# Patient Record
Sex: FEMALE | URBAN - METROPOLITAN AREA
[De-identification: names, ages, dates, MRNs, and addresses within clinical notes are randomized per-mention and may not be internally consistent; named-entity substitution may affect disease eponyms.]

---

## 2018-06-02 PROBLEM — Z00.00 ENCOUNTER FOR PREVENTIVE HEALTH EXAMINATION: Status: ACTIVE | Noted: 2018-06-02

## 2018-09-26 ENCOUNTER — EMERGENCY (EMERGENCY)
Facility: HOSPITAL | Age: 33
LOS: 0 days | Discharge: HOME | End: 2018-09-26
Admitting: EMERGENCY MEDICINE

## 2018-09-26 ENCOUNTER — OUTPATIENT (OUTPATIENT)
Dept: OUTPATIENT SERVICES | Facility: HOSPITAL | Age: 33
LOS: 1 days | Discharge: HOME | End: 2018-09-26

## 2018-09-26 VITALS
TEMPERATURE: 98 F | HEART RATE: 138 BPM | SYSTOLIC BLOOD PRESSURE: 138 MMHG | DIASTOLIC BLOOD PRESSURE: 64 MMHG | OXYGEN SATURATION: 94 % | RESPIRATION RATE: 20 BRPM

## 2018-09-26 VITALS
OXYGEN SATURATION: 96 % | SYSTOLIC BLOOD PRESSURE: 125 MMHG | DIASTOLIC BLOOD PRESSURE: 69 MMHG | TEMPERATURE: 98 F | HEART RATE: 139 BPM | RESPIRATION RATE: 20 BRPM

## 2018-09-26 DIAGNOSIS — R00.2 PALPITATIONS: ICD-10-CM

## 2018-09-26 DIAGNOSIS — O99.89 OTHER SPECIFIED DISEASES AND CONDITIONS COMPLICATING PREGNANCY, CHILDBIRTH AND THE PUERPERIUM: ICD-10-CM

## 2018-09-26 DIAGNOSIS — Z02.9 ENCOUNTER FOR ADMINISTRATIVE EXAMINATIONS, UNSPECIFIED: ICD-10-CM

## 2018-09-26 NOTE — ED PROVIDER NOTE - OBJECTIVE STATEMENT
32 y/o  female currently 36 wk pregnant presents with 5 minute episode of palpitations earlier tonight. unknown palliating/provoking factors. +hx of previous, never worked up. no cp/chest pressure/SOB/BCEKER.  Denies back pain, abdominal pain, n/v/d, black or bloody stools, fevers, trauma, fall, cough, recent travel, recent illness, sick contacts, leg pain/swelling, urinary symptoms, rash.

## 2018-09-26 NOTE — ED PROVIDER NOTE - PHYSICAL EXAMINATION
PHYSICAL EXAM:    GENERAL: Alert, appears stated age, well appearing, non-toxic. gravid.   SKIN: Warm, pink and dry. MMM.   EYE: Normal lids/conjunctiva, PERRL, EOMI  ENT: Normal hearing, patent oropharynx  NECK: +supple. No meningismus  Pulm: Bilateral BS, normal resp effort, no wheezes, stridor, or retractions  CV: RRR, no M/R/G, 2+ pulses   Abd: soft, non-tender. gravid.   Mskel: no erythema, cyanosis, edema. no calf tenderness.   Neuro: AAOx3, no sensory/motor deficits, CN 2-12 intact. No speech slurring, pronator drift, facial asymmetry. normal finger-to-nose b/l. 5/5 strength throughout. normal gait. negative romberg.

## 2018-09-26 NOTE — ED ADULT NURSE NOTE - OBJECTIVE STATEMENT
Patient reports she began to have sob and palpitation which lasted 5-10 minutes. Patient is 36 weeks pregnant. Symptoms subsided once picked up by ems.

## 2018-09-26 NOTE — ED ADULT NURSE NOTE - NSIMPLEMENTINTERV_GEN_ALL_ED
Implemented All Universal Safety Interventions:  [place] to call system. Call bell, personal items and telephone within reach. Instruct patient to call for assistance. Room bathroom lighting operational. Non-slip footwear when patient is off stretcher. Physically safe environment: no spills, clutter or unnecessary equipment. Stretcher in lowest position, wheels locked, appropriate side rails in place.

## 2018-09-26 NOTE — ED ADULT NURSE NOTE - NS ED NURSE LEVEL OF CONSCIOUSNESS AFFECT
Calm
For information on Fall & Injury Prevention, visit [email or web address]

## 2018-09-26 NOTE — ED PROVIDER NOTE - PROGRESS NOTE DETAILS
pt continues to deny symptoms.  on bedsides sono. discussed with OB resident, recommend our workup and then send to L&D for fetal heart tracing. pt feels well.  HR improved to 99.  has some lower back pain from sitting in the stretcher, but ready to be assessed in L&D.  no cp, no sob.

## 2018-09-26 NOTE — ED PROVIDER NOTE - ATTENDING CONTRIBUTION TO CARE
34 y/o F , 36 weeks pregnant - episode of palpitations that lasted 5 mins today.  no sob, no cp.  No ap, vag d/c or bleeding.  No sxs now. 32 y/o F , 36 weeks pregnant - episode of palpitations that started at 7pm and lasted a few minutes today then resolved.  Pt has had a few of these episodes through pregnancy and when they happen she does have some sob, but only then the episodes occur.  No sob at any other time.  no cp.  No ap, vag d/c or bleeding.  No sxs now here in ER even though she is slightly tachy at 114.  Received 1 L of fluid from EMS.  No recent illness. no n/v/d. No dysuria.  EXAM: well appearing. NAD. s1s2, reg. CTAB. abd soft, nd, nt, gravid.  bedside US with FHR in 120s.  P: ob c/s.  labs, ekg, ivf.

## 2018-09-27 VITALS
SYSTOLIC BLOOD PRESSURE: 122 MMHG | TEMPERATURE: 99 F | DIASTOLIC BLOOD PRESSURE: 73 MMHG | HEART RATE: 115 BPM | RESPIRATION RATE: 16 BRPM

## 2018-09-27 DIAGNOSIS — Z90.49 ACQUIRED ABSENCE OF OTHER SPECIFIED PARTS OF DIGESTIVE TRACT: Chronic | ICD-10-CM

## 2018-09-27 LAB
ANION GAP SERPL CALC-SCNC: 13 MMOL/L — SIGNIFICANT CHANGE UP (ref 7–14)
BASOPHILS # BLD AUTO: 0.02 K/UL — SIGNIFICANT CHANGE UP (ref 0–0.2)
BASOPHILS NFR BLD AUTO: 0.2 % — SIGNIFICANT CHANGE UP (ref 0–1)
BUN SERPL-MCNC: 6 MG/DL — LOW (ref 10–20)
CALCIUM SERPL-MCNC: 8.6 MG/DL — SIGNIFICANT CHANGE UP (ref 8.5–10.1)
CHLORIDE SERPL-SCNC: 106 MMOL/L — SIGNIFICANT CHANGE UP (ref 98–110)
CO2 SERPL-SCNC: 20 MMOL/L — SIGNIFICANT CHANGE UP (ref 17–32)
CREAT SERPL-MCNC: 0.5 MG/DL — LOW (ref 0.7–1.5)
EOSINOPHIL # BLD AUTO: 0.05 K/UL — SIGNIFICANT CHANGE UP (ref 0–0.7)
EOSINOPHIL NFR BLD AUTO: 0.5 % — SIGNIFICANT CHANGE UP (ref 0–8)
GLUCOSE SERPL-MCNC: 126 MG/DL — HIGH (ref 70–99)
HCT VFR BLD CALC: 29.7 % — LOW (ref 37–47)
HGB BLD-MCNC: 9.4 G/DL — LOW (ref 12–16)
IMM GRANULOCYTES NFR BLD AUTO: 0.4 % — HIGH (ref 0.1–0.3)
LYMPHOCYTES # BLD AUTO: 2.02 K/UL — SIGNIFICANT CHANGE UP (ref 1.2–3.4)
LYMPHOCYTES # BLD AUTO: 22.2 % — SIGNIFICANT CHANGE UP (ref 20.5–51.1)
MAGNESIUM SERPL-MCNC: 1.8 MG/DL — SIGNIFICANT CHANGE UP (ref 1.8–2.4)
MCHC RBC-ENTMCNC: 25.7 PG — LOW (ref 27–31)
MCHC RBC-ENTMCNC: 31.6 G/DL — LOW (ref 32–37)
MCV RBC AUTO: 81.1 FL — SIGNIFICANT CHANGE UP (ref 81–99)
MONOCYTES # BLD AUTO: 0.61 K/UL — HIGH (ref 0.1–0.6)
MONOCYTES NFR BLD AUTO: 6.7 % — SIGNIFICANT CHANGE UP (ref 1.7–9.3)
NEUTROPHILS # BLD AUTO: 6.37 K/UL — SIGNIFICANT CHANGE UP (ref 1.4–6.5)
NEUTROPHILS NFR BLD AUTO: 70 % — SIGNIFICANT CHANGE UP (ref 42.2–75.2)
NRBC # BLD: 0 /100 WBCS — SIGNIFICANT CHANGE UP (ref 0–0)
PLATELET # BLD AUTO: 200 K/UL — SIGNIFICANT CHANGE UP (ref 130–400)
POTASSIUM SERPL-MCNC: 3.8 MMOL/L — SIGNIFICANT CHANGE UP (ref 3.5–5)
POTASSIUM SERPL-SCNC: 3.8 MMOL/L — SIGNIFICANT CHANGE UP (ref 3.5–5)
RBC # BLD: 3.66 M/UL — LOW (ref 4.2–5.4)
RBC # FLD: 14.5 % — SIGNIFICANT CHANGE UP (ref 11.5–14.5)
SODIUM SERPL-SCNC: 139 MMOL/L — SIGNIFICANT CHANGE UP (ref 135–146)
WBC # BLD: 9.11 K/UL — SIGNIFICANT CHANGE UP (ref 4.8–10.8)
WBC # FLD AUTO: 9.11 K/UL — SIGNIFICANT CHANGE UP (ref 4.8–10.8)

## 2018-09-27 RX ORDER — ACETAMINOPHEN 500 MG
650 TABLET ORAL ONCE
Qty: 0 | Refills: 0 | Status: COMPLETED | OUTPATIENT
Start: 2018-09-27 | End: 2018-09-27

## 2018-09-27 RX ORDER — SODIUM CHLORIDE 9 MG/ML
1000 INJECTION INTRAMUSCULAR; INTRAVENOUS; SUBCUTANEOUS ONCE
Qty: 0 | Refills: 0 | Status: COMPLETED | OUTPATIENT
Start: 2018-09-27 | End: 2018-09-27

## 2018-09-27 RX ORDER — ACETAMINOPHEN 500 MG
650 TABLET ORAL ONCE
Qty: 0 | Refills: 0 | Status: DISCONTINUED | OUTPATIENT
Start: 2018-09-27 | End: 2018-10-11

## 2018-09-27 RX ADMIN — SODIUM CHLORIDE 1000 MILLILITER(S): 9 INJECTION INTRAMUSCULAR; INTRAVENOUS; SUBCUTANEOUS at 01:31

## 2018-09-27 NOTE — OB PROVIDER TRIAGE NOTE - NSHPLABSRESULTS_GEN_ALL_CORE
9.4    9.11  )-----------( 200      ( 27 Sep 2018 01:25 )             29.7       09-27    139  |  106  |  6<L>  ----------------------------<  126<H>  3.8   |  20  |  0.5<L>    Ca    8.6      27 Sep 2018 01:25  Mg     1.8     09-27 9.4    9.11  )-----------( 200      ( 27 Sep 2018 01:25 )             29.7       09-27    139  |  106  |  6<L>  ----------------------------<  126<H>  3.8   |  20  |  0.5<L>    Ca    8.6      27 Sep 2018 01:25  Mg     1.8     09-27          9/24/18- HIV NR; GBS POS   3/15/18- O pos; RPR NR, HIV NR, HbsAg NR; rubella immune

## 2018-09-27 NOTE — OB PROVIDER TRIAGE NOTE - NSOBPROVIDERNOTE_OBGYN_ALL_OB_FT
32yo  at 36w3d GA, GBS pos, h/o previous C/S X1,  X4, with BPP 8/8 and reassuring maternal and fetal status, not in  labor; palpitations now resolved.   - d/c home   - iron PO qD   - ambulation/PO hydration  -  labor precautions/FKC   - f/u next scheduled appt.

## 2018-09-27 NOTE — OB PROVIDER TRIAGE NOTE - HISTORY OF PRESENT ILLNESS
32yo  at 36w3d GA, by 1st trim sono, c/o palpitations since earlier this evening. Cleared by ED, EKG X 2 showed sinus tachycardia. Palpitations now resolved. Denies dizziness/lightheadedness/CP/SOB/syncopal episodes. Denies ctx, incisional pain, VB/LOF. Good FM. Denies fever, chills, nausea/vomiting, diarrhea/constipation, dysuria, urgency, frequency. No complications during this pregnancy. H/o C/S X1 for CPD followed by  X4.     ObHx: C/S X1- 9lb1oz for CPD;  X4, 5ip85db; h/o GDMA1 in last pregnancy    GynHx: denies h/o fibroids, ovarian cysts, abnormal pap smears, STIs. 34yo  at 36w3d GA, by 1st trim sono, c/o palpitations since earlier this evening. Cleared by ED, EKG X 2 showed sinus tachycardia. Palpitations now resolved after drinking fluids. Denies dizziness/lightheadedness/CP/SOB/syncopal episodes. Denies ctx, incisional pain, VB/LOF. Good FM. Denies fever, chills, nausea/vomiting, diarrhea/constipation, dysuria, urgency, frequency. No complications during this pregnancy. H/o C/S X1 for CPD followed by  X4.     ObHx: C/S X1- 9lb1oz for CPD;  X4, 0ks55am; h/o GDMA1 in last pregnancy    GynHx: denies h/o fibroids, ovarian cysts, abnormal pap smears, STIs.

## 2018-09-27 NOTE — OB PROVIDER TRIAGE NOTE - NSHPPHYSICALEXAM_GEN_ALL_CORE
Vital Signs Last 24 Hrs  T(C): 37.1 (27 Sep 2018 03:11), Max: 37.1 (27 Sep 2018 03:11)  T(F): 98.7 (27 Sep 2018 03:11), Max: 98.7 (27 Sep 2018 03:11)  HR: 115 (27 Sep 2018 03:11) (99 - 139)  BP: 122/73 (27 Sep 2018 03:11) (114/56 - 138/64)  RR: 16 (27 Sep 2018 03:11) (16 - 20)  SpO2: 97% (27 Sep 2018 01:00) (94% - 97%)     Gen: NAD, AAO X 3  Heart: tachycardic, no M/R/G   Lungs: CTA B/L, no r/r/w   Abd: soft, gravid, nontender, no palpable ctx, no incisional tenderness Vital Signs Last 24 Hrs  T(C): 37.1 (27 Sep 2018 03:11), Max: 37.1 (27 Sep 2018 03:11)  T(F): 98.7 (27 Sep 2018 03:11), Max: 98.7 (27 Sep 2018 03:11)  HR: 115 (27 Sep 2018 03:11) (99 - 139)  BP: 122/73 (27 Sep 2018 03:11) (114/56 - 138/64)  RR: 16 (27 Sep 2018 03:11) (16 - 20)  SpO2: 97% (27 Sep 2018 01:00) (94% - 97%)     Gen: NAD, AAO X 3  Heart: tachycardic, no M/R/G   Lungs: CTA B/L, no r/r/w   Abd: soft, gravid, nontender, no palpable ctx, no incisional tenderness  EFM: 135/mod/accels+  [place]: no ctx   SVE: deferred

## 2018-10-30 ENCOUNTER — INPATIENT (INPATIENT)
Facility: HOSPITAL | Age: 33
LOS: 1 days | Discharge: HOME | End: 2018-11-01
Attending: OBSTETRICS & GYNECOLOGY | Admitting: OBSTETRICS & GYNECOLOGY
Payer: MEDICAID

## 2018-10-30 VITALS — TEMPERATURE: 98 F

## 2018-10-30 DIAGNOSIS — Z90.49 ACQUIRED ABSENCE OF OTHER SPECIFIED PARTS OF DIGESTIVE TRACT: Chronic | ICD-10-CM

## 2018-10-30 LAB
AMPHET UR-MCNC: NEGATIVE — SIGNIFICANT CHANGE UP
APPEARANCE UR: ABNORMAL
BACTERIA # UR AUTO: ABNORMAL /HPF
BARBITURATES UR SCN-MCNC: NEGATIVE — SIGNIFICANT CHANGE UP
BASOPHILS # BLD AUTO: 0.03 K/UL — SIGNIFICANT CHANGE UP (ref 0–0.2)
BASOPHILS NFR BLD AUTO: 0.2 % — SIGNIFICANT CHANGE UP (ref 0–1)
BENZODIAZ UR-MCNC: NEGATIVE — SIGNIFICANT CHANGE UP
BILIRUB UR-MCNC: NEGATIVE — SIGNIFICANT CHANGE UP
BLD GP AB SCN SERPL QL: SIGNIFICANT CHANGE UP
COCAINE METAB.OTHER UR-MCNC: NEGATIVE — SIGNIFICANT CHANGE UP
COLOR SPEC: YELLOW — SIGNIFICANT CHANGE UP
DIFF PNL FLD: ABNORMAL
EOSINOPHIL # BLD AUTO: 0.03 K/UL — SIGNIFICANT CHANGE UP (ref 0–0.7)
EOSINOPHIL NFR BLD AUTO: 0.2 % — SIGNIFICANT CHANGE UP (ref 0–8)
EPI CELLS # UR: ABNORMAL /HPF
GLUCOSE UR QL: NEGATIVE MG/DL — SIGNIFICANT CHANGE UP
HCT VFR BLD CALC: 35.6 % — LOW (ref 37–47)
HGB BLD-MCNC: 10.9 G/DL — LOW (ref 12–16)
IMM GRANULOCYTES NFR BLD AUTO: 0.5 % — HIGH (ref 0.1–0.3)
KETONES UR-MCNC: 15
LEUKOCYTE ESTERASE UR-ACNC: ABNORMAL
LYMPHOCYTES # BLD AUTO: 2.54 K/UL — SIGNIFICANT CHANGE UP (ref 1.2–3.4)
LYMPHOCYTES # BLD AUTO: 20.5 % — SIGNIFICANT CHANGE UP (ref 20.5–51.1)
MCHC RBC-ENTMCNC: 23.5 PG — LOW (ref 27–31)
MCHC RBC-ENTMCNC: 30.6 G/DL — LOW (ref 32–37)
MCV RBC AUTO: 76.9 FL — LOW (ref 81–99)
METHADONE UR-MCNC: NEGATIVE — SIGNIFICANT CHANGE UP
MONOCYTES # BLD AUTO: 0.94 K/UL — HIGH (ref 0.1–0.6)
MONOCYTES NFR BLD AUTO: 7.6 % — SIGNIFICANT CHANGE UP (ref 1.7–9.3)
NEUTROPHILS # BLD AUTO: 8.79 K/UL — HIGH (ref 1.4–6.5)
NEUTROPHILS NFR BLD AUTO: 71 % — SIGNIFICANT CHANGE UP (ref 42.2–75.2)
NITRITE UR-MCNC: NEGATIVE — SIGNIFICANT CHANGE UP
NRBC # BLD: 0 /100 WBCS — SIGNIFICANT CHANGE UP (ref 0–0)
OPIATES UR-MCNC: NEGATIVE — SIGNIFICANT CHANGE UP
PCP SPEC-MCNC: SIGNIFICANT CHANGE UP
PH UR: 6 — SIGNIFICANT CHANGE UP (ref 5–8)
PLATELET # BLD AUTO: 213 K/UL — SIGNIFICANT CHANGE UP (ref 130–400)
PRENATAL SYPHILIS TEST: SIGNIFICANT CHANGE UP
PROPOXYPHENE QUALITATIVE URINE RESULT: NEGATIVE — SIGNIFICANT CHANGE UP
PROT UR-MCNC: NEGATIVE MG/DL — SIGNIFICANT CHANGE UP
RBC # BLD: 4.63 M/UL — SIGNIFICANT CHANGE UP (ref 4.2–5.4)
RBC # FLD: 15.8 % — HIGH (ref 11.5–14.5)
RBC CASTS # UR COMP ASSIST: ABNORMAL /HPF
SP GR SPEC: 1.02 — SIGNIFICANT CHANGE UP (ref 1.01–1.03)
T PALLIDUM AB TITR SER: NEGATIVE — SIGNIFICANT CHANGE UP
TYPE + AB SCN PNL BLD: SIGNIFICANT CHANGE UP
UROBILINOGEN FLD QL: 0.2 MG/DL — SIGNIFICANT CHANGE UP (ref 0.2–0.2)
WBC # BLD: 12.39 K/UL — HIGH (ref 4.8–10.8)
WBC # FLD AUTO: 12.39 K/UL — HIGH (ref 4.8–10.8)
WBC UR QL: ABNORMAL /HPF

## 2018-10-30 RX ORDER — IBUPROFEN 200 MG
600 TABLET ORAL EVERY 6 HOURS
Qty: 0 | Refills: 0 | Status: DISCONTINUED | OUTPATIENT
Start: 2018-10-30 | End: 2018-11-01

## 2018-10-30 RX ORDER — ACETAMINOPHEN 500 MG
650 TABLET ORAL EVERY 6 HOURS
Qty: 0 | Refills: 0 | Status: DISCONTINUED | OUTPATIENT
Start: 2018-10-30 | End: 2018-11-01

## 2018-10-30 RX ORDER — OXYTOCIN 10 UNIT/ML
20 VIAL (ML) INJECTION ONCE
Qty: 0 | Refills: 0 | Status: DISCONTINUED | OUTPATIENT
Start: 2018-10-30 | End: 2018-11-01

## 2018-10-30 RX ORDER — ONDANSETRON 8 MG/1
4 TABLET, FILM COATED ORAL EVERY 6 HOURS
Qty: 0 | Refills: 0 | Status: DISCONTINUED | OUTPATIENT
Start: 2018-10-30 | End: 2018-11-01

## 2018-10-30 RX ORDER — NALOXONE HYDROCHLORIDE 4 MG/.1ML
0.1 SPRAY NASAL
Qty: 0 | Refills: 0 | Status: DISCONTINUED | OUTPATIENT
Start: 2018-10-30 | End: 2018-11-01

## 2018-10-30 RX ORDER — AMPICILLIN TRIHYDRATE 250 MG
2 CAPSULE ORAL ONCE
Qty: 0 | Refills: 0 | Status: COMPLETED | OUTPATIENT
Start: 2018-10-30 | End: 2018-10-30

## 2018-10-30 RX ORDER — SIMETHICONE 80 MG/1
80 TABLET, CHEWABLE ORAL EVERY 6 HOURS
Qty: 0 | Refills: 0 | Status: DISCONTINUED | OUTPATIENT
Start: 2018-10-30 | End: 2018-11-01

## 2018-10-30 RX ORDER — AMPICILLIN TRIHYDRATE 250 MG
CAPSULE ORAL
Qty: 0 | Refills: 0 | Status: DISCONTINUED | OUTPATIENT
Start: 2018-10-30 | End: 2018-10-30

## 2018-10-30 RX ORDER — DOCUSATE SODIUM 100 MG
100 CAPSULE ORAL
Qty: 0 | Refills: 0 | Status: DISCONTINUED | OUTPATIENT
Start: 2018-10-30 | End: 2018-11-01

## 2018-10-30 RX ORDER — DIPHENHYDRAMINE HCL 50 MG
25 CAPSULE ORAL EVERY 6 HOURS
Qty: 0 | Refills: 0 | Status: DISCONTINUED | OUTPATIENT
Start: 2018-10-30 | End: 2018-11-01

## 2018-10-30 RX ORDER — AMPICILLIN TRIHYDRATE 250 MG
1 CAPSULE ORAL EVERY 4 HOURS
Qty: 0 | Refills: 0 | Status: DISCONTINUED | OUTPATIENT
Start: 2018-10-30 | End: 2018-10-30

## 2018-10-30 RX ORDER — OXYTOCIN 10 UNIT/ML
125 VIAL (ML) INJECTION
Qty: 20 | Refills: 0 | Status: DISCONTINUED | OUTPATIENT
Start: 2018-10-30 | End: 2018-10-30

## 2018-10-30 RX ORDER — LANOLIN
1 OINTMENT (GRAM) TOPICAL EVERY 6 HOURS
Qty: 0 | Refills: 0 | Status: DISCONTINUED | OUTPATIENT
Start: 2018-10-30 | End: 2018-11-01

## 2018-10-30 RX ORDER — SODIUM CHLORIDE 9 MG/ML
500 INJECTION, SOLUTION INTRAVENOUS ONCE
Qty: 0 | Refills: 0 | Status: DISCONTINUED | OUTPATIENT
Start: 2018-10-30 | End: 2018-10-30

## 2018-10-30 RX ORDER — FENTANYL/BUPIVACAINE/NS/PF 2MCG/ML-.1
250 PLASTIC BAG, INJECTION (ML) INJECTION
Qty: 0 | Refills: 0 | Status: DISCONTINUED | OUTPATIENT
Start: 2018-10-30 | End: 2018-11-01

## 2018-10-30 RX ORDER — AER TRAVELER 0.5 G/1
1 SOLUTION RECTAL; TOPICAL EVERY 4 HOURS
Qty: 0 | Refills: 0 | Status: DISCONTINUED | OUTPATIENT
Start: 2018-10-30 | End: 2018-11-01

## 2018-10-30 RX ORDER — MAGNESIUM HYDROXIDE 400 MG/1
30 TABLET, CHEWABLE ORAL
Qty: 0 | Refills: 0 | Status: DISCONTINUED | OUTPATIENT
Start: 2018-10-30 | End: 2018-11-01

## 2018-10-30 RX ORDER — DIBUCAINE 1 %
1 OINTMENT (GRAM) RECTAL EVERY 4 HOURS
Qty: 0 | Refills: 0 | Status: DISCONTINUED | OUTPATIENT
Start: 2018-10-30 | End: 2018-11-01

## 2018-10-30 RX ORDER — OXYCODONE AND ACETAMINOPHEN 5; 325 MG/1; MG/1
2 TABLET ORAL EVERY 6 HOURS
Qty: 0 | Refills: 0 | Status: DISCONTINUED | OUTPATIENT
Start: 2018-10-30 | End: 2018-11-01

## 2018-10-30 RX ORDER — SODIUM CHLORIDE 9 MG/ML
1000 INJECTION, SOLUTION INTRAVENOUS
Qty: 0 | Refills: 0 | Status: DISCONTINUED | OUTPATIENT
Start: 2018-10-30 | End: 2018-10-30

## 2018-10-30 RX ORDER — OXYTOCIN 10 UNIT/ML
41.67 VIAL (ML) INJECTION
Qty: 20 | Refills: 0 | Status: DISCONTINUED | OUTPATIENT
Start: 2018-10-30 | End: 2018-11-01

## 2018-10-30 RX ADMIN — Medication 0.2 MILLIGRAM(S): at 11:41

## 2018-10-30 RX ADMIN — Medication 600 MILLIGRAM(S): at 23:40

## 2018-10-30 RX ADMIN — Medication 0.2 MILLIGRAM(S): at 19:14

## 2018-10-30 RX ADMIN — Medication 600 MILLIGRAM(S): at 11:01

## 2018-10-30 RX ADMIN — Medication 600 MILLIGRAM(S): at 23:06

## 2018-10-30 RX ADMIN — Medication 0.2 MILLIGRAM(S): at 15:12

## 2018-10-30 RX ADMIN — Medication 125 MILLIUNIT(S)/MIN: at 11:51

## 2018-10-30 RX ADMIN — Medication 216 GRAM(S): at 04:00

## 2018-10-30 RX ADMIN — Medication 208 GRAM(S): at 07:56

## 2018-10-30 RX ADMIN — OXYCODONE AND ACETAMINOPHEN 2 TABLET(S): 5; 325 TABLET ORAL at 11:51

## 2018-10-30 NOTE — OB PROVIDER H&P - NSHPLABSRESULTS_GEN_ALL_CORE
early       22w0d GA AGA 535g, 21%ile, post placenta, agfv nl, CL 37mm, placental lake 4.1x1.9cm, anatomy nl  28w3d GA vtx, BPP 8/8, MVP 5.92cm, EFW 1640g (>97%ile), no placental lakes  33w2d GA EFW 2649g (61%ile)

## 2018-10-30 NOTE — OB PROVIDER H&P - HISTORY OF PRESENT ILLNESS
33yo  at 41w1d GA presents with contractions x1 day, worse for the past few hours, now 10/10 intensity every few minutes.  Denies LOF, vaginal bleeding.  Reports good fetal movement.  Pt has h/o GDMA P5, negative GCT x2 this pregnancy.  H/o c/s with P1, largest infant.  VBACx4, desires TOLAC.  Pt prenatal sonogram at 28wks showed fetus measuring >97%ile, pt unsure if she has had a sonogram since that time.

## 2018-10-30 NOTE — CHART NOTE - NSCHARTNOTEFT_GEN_A_CORE
JESSI PGY2 Note:     Pt seen and evaluated at bedside for vaginal bleeding. Denies dizziness/lightheadedness/CP/SOB/palpitations.   Bimanual massage performed, fundus found to be firm; 400cc clots evacuated from lower segment.   Bertrand catheter replaced, 100cc urine drained.     Vital Signs Last 24 Hrs  T(C): 36.9 (30 Oct 2018 04:01), Max: 37.1 (30 Oct 2018 03:08)  T(F): 98.4 (30 Oct 2018 04:01), Max: 98.7 (30 Oct 2018 03:08)  HR: 110 (30 Oct 2018 11:18) (81 - 142)  BP: 146/84 (30 Oct 2018 11:18) (79/52 - 146/84)  RR: 18 (30 Oct 2018 05:09) (16 - 18)    - extra 20U pitocin added IVPB   - will re-evaluate     Dr. Haywood and Dr. Olson aware. OBSUGARN PGY2 Note:     Pt seen and evaluated at bedside for vaginal bleeding. Denies dizziness/lightheadedness/CP/SOB/palpitations.   Bimanual massage performed, fundus found to be firm; 400cc clots evacuated from lower segment.   Bertrand catheter replaced, 100cc urine drained.     Vital Signs Last 24 Hrs  T(C): 36.9 (30 Oct 2018 04:01), Max: 37.1 (30 Oct 2018 03:08)  T(F): 98.4 (30 Oct 2018 04:01), Max: 98.7 (30 Oct 2018 03:08)  HR: 110 (30 Oct 2018 11:18) (81 - 142)  BP: 146/84 (30 Oct 2018 11:18) (79/52 - 146/84)  RR: 18 (30 Oct 2018 05:09) (16 - 18)    - extra 20U pitocin added IVPB   - will re-evaluate     Dr. Haywood and Dr. Olson aware.      Addendum - Dr Olson    I expressed 200 cc more of blood clots. Retinaed placenta membranes removed. Will methergine Im and start methergine PO fro 24 hours. uterus firm.

## 2018-10-30 NOTE — PROCEDURE NOTE - ADDITIONAL PROCEDURE DETAILS
Epidural infusion - 0.1% bupivacaine + fentanyl 2mcg/ml at 15ml/hr Epidural infusion - 0.1% bupivacaine + fentanyl 2mcg/ml at 15ml/hr    Bolus 0550 10/30/18 - 0.125% bupiv 10ml. Epidural infusion - 0.1% bupivacaine + fentanyl 2mcg/ml at 15ml/hr    Bolus 0550 10/30/18 - 0.125% bupiv 10ml.    Pt reported pain after bolus. On exam she had a one sided block with left sided sparing. Catheter was withdrawn 2cm and bolused with 10ml of 0.25% bupiv which provided great relief, T8 level bilaterally.

## 2018-10-30 NOTE — OB PROVIDER H&P - NS_OBGYNHISTORY_OBGYN_ALL_OB_FT
OB Hx: c/s x1 arrest of descent, 9lb1oz, VBACx4, largest 8xa82pt, h/o GDMA1 P5, h/o open appendectomy in pregnancy (cannot remember which pregnancy)  Gyn Hx: denies h/o abnormal PAP, ovarian cysts, STIs, uterine fibroids

## 2018-10-30 NOTE — OB PROVIDER DELIVERY SUMMARY - NSPROVIDERDELIVERYNOTE_OBGYN_ALL_OB_FT
Patient was fully dilated and pushing. Fetal head was OA and restituted to ROT. The anterior shoulder did not deliver spontaneously, eden and suprapubic attemtped, posterior arm delivery attempted as well. Then woodscrew as well. and posterior shoulders delivered, followed by the remaining body atraumatically. Delayed cord clamping was performed, and then clamped and cut. Cord blood gases collected x2. The  was handed to the pediatricians. The placenta delivered intact with membranes. Pitocin was administered (+/- additional interventions). Uterus massaged, fundus found to be firm. Cervix, vagina and perineum inspected (no lacerations OR x degree laceration was noted, repaired using (type of suture) in the usual fashion with good hemostasis.     Viable male/female infant delivered, weighing (gram or lbs), with APGARs ?/?    Lacteration:   EBL ####     present for the delivery Patient was fully dilated and pushing. Fetal head was OA and restituted to ROT. The anterior shoulder did not deliver spontaneously, eden and suprapubic attemtped, posterior arm delivery attempted as well. Then woodscrew as well.  Suproapubic pressure attempted again and anterior shoulder dislodged. Posterior shoulder delivered, followed by the remaining body atraumatically. There was a time difference of 2 mins from delivery of head to body. Cord clamped and cut. Cord blood gases collected x2. The  was handed to the pediatricians. The placenta delivered. Pitocin was administered . Uterus massaged, fundus found to be firm. Cervix, vagina and perineum inspected. Second degree laceration was noted, repaired using 2-0 chromic in the usual fashion with good hemostasis.     Viable female infant delivered, weighing 12 lb 3 oz, with APGARs 2/8    Lacteration: seocnd degree  EBL 350cc

## 2018-10-30 NOTE — PROCEDURE NOTE - SUPERVISORY STATEMENT
Vital signs stable  No anesthesia complications from labor epidural  Patient discharged to OB post partum care as per policy

## 2018-10-30 NOTE — OB PROVIDER H&P - NSHPPHYSICALEXAM_GEN_ALL_CORE
Vital Signs  T(F): 98.7   HR: 116   BP: 124/75   RR: 16   Udip: trace ketones    EFM: 120/mod/+accels  [place]: q2-5    Gen: discomfort with contractions  Abd: palpable contractions, no incisional tenderness  SVE: 7/80/-2, vtx, bulging membranes

## 2018-10-30 NOTE — OB PROVIDER H&P - ASSESSMENT
31yo  at 41w1d GA in active labor, GBS positive, grandmultip, with macrosomia, h/o c/s, VBACx4, desires TOLAC,  -admit to L&D  -continuous EMF/toco  -amp for GBS prophylaxis  -admission labs  -clear liquid diet  -pain mgmt prn, desires epidural    Dr Olson aware

## 2018-10-31 LAB
BASOPHILS # BLD AUTO: 0.02 K/UL — SIGNIFICANT CHANGE UP (ref 0–0.2)
BASOPHILS NFR BLD AUTO: 0.2 % — SIGNIFICANT CHANGE UP (ref 0–1)
EOSINOPHIL # BLD AUTO: 0.06 K/UL — SIGNIFICANT CHANGE UP (ref 0–0.7)
EOSINOPHIL NFR BLD AUTO: 0.6 % — SIGNIFICANT CHANGE UP (ref 0–8)
HCT VFR BLD CALC: 25.7 % — LOW (ref 37–47)
HGB BLD-MCNC: 7.7 G/DL — LOW (ref 12–16)
IMM GRANULOCYTES NFR BLD AUTO: 0.5 % — HIGH (ref 0.1–0.3)
LYMPHOCYTES # BLD AUTO: 1.91 K/UL — SIGNIFICANT CHANGE UP (ref 1.2–3.4)
LYMPHOCYTES # BLD AUTO: 18.6 % — LOW (ref 20.5–51.1)
MCHC RBC-ENTMCNC: 23.2 PG — LOW (ref 27–31)
MCHC RBC-ENTMCNC: 30 G/DL — LOW (ref 32–37)
MCV RBC AUTO: 77.4 FL — LOW (ref 81–99)
MONOCYTES # BLD AUTO: 0.73 K/UL — HIGH (ref 0.1–0.6)
MONOCYTES NFR BLD AUTO: 7.1 % — SIGNIFICANT CHANGE UP (ref 1.7–9.3)
NEUTROPHILS # BLD AUTO: 7.48 K/UL — HIGH (ref 1.4–6.5)
NEUTROPHILS NFR BLD AUTO: 73 % — SIGNIFICANT CHANGE UP (ref 42.2–75.2)
NRBC # BLD: 0 /100 WBCS — SIGNIFICANT CHANGE UP (ref 0–0)
PLATELET # BLD AUTO: 182 K/UL — SIGNIFICANT CHANGE UP (ref 130–400)
RBC # BLD: 3.32 M/UL — LOW (ref 4.2–5.4)
RBC # FLD: 15.8 % — HIGH (ref 11.5–14.5)
WBC # BLD: 10.25 K/UL — SIGNIFICANT CHANGE UP (ref 4.8–10.8)
WBC # FLD AUTO: 10.25 K/UL — SIGNIFICANT CHANGE UP (ref 4.8–10.8)

## 2018-10-31 RX ADMIN — Medication 0.2 MILLIGRAM(S): at 00:16

## 2018-10-31 RX ADMIN — Medication 1 TABLET(S): at 11:53

## 2018-10-31 RX ADMIN — Medication 0.2 MILLIGRAM(S): at 04:35

## 2018-10-31 RX ADMIN — Medication 600 MILLIGRAM(S): at 14:16

## 2018-10-31 RX ADMIN — Medication 600 MILLIGRAM(S): at 05:49

## 2018-10-31 RX ADMIN — Medication 0.2 MILLIGRAM(S): at 07:39

## 2018-10-31 NOTE — PROGRESS NOTE ADULT - SUBJECTIVE AND OBJECTIVE BOX
OB attending  PPD #1    Pt doing well, pain well controlled. No overnight events, no acute complaints.    Ambulating: Yes  Voiding: Yes  Flatus: Yes  Bowel movements: Yes   Breast or bottle feeding: Breastfeeding  Diet: Regular    PAST MEDICAL & SURGICAL HISTORY:  No pertinent past medical history  S/P appendectomy  Delivery by  section of full-term infant      Physical Exam  Vital Signs Last 24 Hrs  T(C): 35.7 (31 Oct 2018 07:48), Max: 36.9 (30 Oct 2018 13:56)  T(F): 96.3 (31 Oct 2018 07:48), Max: 98.4 (30 Oct 2018 13:56)  HR: 75 (31 Oct 2018 07:48) (73 - 110)  BP: 105/57 (31 Oct 2018 07:48) (102/51 - 146/84)  BP(mean): --  RR: 18 (31 Oct 2018 07:48) (18 - 18)  SpO2: --  Gen: AAOx3, NAD  Abd: Soft, nontender, nondistended, BS+  Fundus: Firm, below umbilicus  Lochia: normal  Ext: No calf tenderness, no swelling    Labs:                        7.7    10.25 )-----------( 182      ( 31 Oct 2018 07:18 )             25.7         A/P: s/p , PPD #1, doing well  - continue current management  anticipate d/c home 18

## 2018-11-01 ENCOUNTER — TRANSCRIPTION ENCOUNTER (OUTPATIENT)
Age: 33
End: 2018-11-01

## 2018-11-01 VITALS
HEART RATE: 79 BPM | OXYGEN SATURATION: 100 % | RESPIRATION RATE: 18 BRPM | SYSTOLIC BLOOD PRESSURE: 99 MMHG | DIASTOLIC BLOOD PRESSURE: 54 MMHG | TEMPERATURE: 98 F

## 2018-11-01 LAB
HCT VFR BLD CALC: 22.4 % — LOW (ref 37–47)
HCT VFR BLD CALC: 24.9 % — LOW (ref 37–47)
HGB BLD-MCNC: 7 G/DL — CRITICAL LOW (ref 12–16)
HGB BLD-MCNC: 7.7 G/DL — LOW (ref 12–16)
MCHC RBC-ENTMCNC: 23.8 PG — LOW (ref 27–31)
MCHC RBC-ENTMCNC: 24.4 PG — LOW (ref 27–31)
MCHC RBC-ENTMCNC: 30.9 G/DL — LOW (ref 32–37)
MCHC RBC-ENTMCNC: 31.3 G/DL — LOW (ref 32–37)
MCV RBC AUTO: 76.9 FL — LOW (ref 81–99)
MCV RBC AUTO: 78 FL — LOW (ref 81–99)
NRBC # BLD: 0 /100 WBCS — SIGNIFICANT CHANGE UP (ref 0–0)
NRBC # BLD: 0 /100 WBCS — SIGNIFICANT CHANGE UP (ref 0–0)
PLATELET # BLD AUTO: 197 K/UL — SIGNIFICANT CHANGE UP (ref 130–400)
PLATELET # BLD AUTO: 220 K/UL — SIGNIFICANT CHANGE UP (ref 130–400)
RBC # BLD: 2.87 M/UL — LOW (ref 4.2–5.4)
RBC # BLD: 3.24 M/UL — LOW (ref 4.2–5.4)
RBC # FLD: 15.9 % — HIGH (ref 11.5–14.5)
RBC # FLD: 15.9 % — HIGH (ref 11.5–14.5)
WBC # BLD: 6.22 K/UL — SIGNIFICANT CHANGE UP (ref 4.8–10.8)
WBC # BLD: 6.49 K/UL — SIGNIFICANT CHANGE UP (ref 4.8–10.8)
WBC # FLD AUTO: 6.22 K/UL — SIGNIFICANT CHANGE UP (ref 4.8–10.8)
WBC # FLD AUTO: 6.49 K/UL — SIGNIFICANT CHANGE UP (ref 4.8–10.8)

## 2018-11-01 PROCEDURE — 99231 SBSQ HOSP IP/OBS SF/LOW 25: CPT

## 2018-11-01 RX ADMIN — Medication 600 MILLIGRAM(S): at 11:10

## 2018-11-01 RX ADMIN — Medication 600 MILLIGRAM(S): at 08:26

## 2018-11-01 RX ADMIN — Medication 600 MILLIGRAM(S): at 00:02

## 2018-11-01 RX ADMIN — Medication 1 TABLET(S): at 11:11

## 2018-11-01 RX ADMIN — Medication 600 MILLIGRAM(S): at 00:32

## 2018-11-01 NOTE — DISCHARGE NOTE OB - CARE PROVIDER_API CALL
Darci Macias), Obstetrics and Gynecology  [address] [place] [address]  Phone: [phone]  Fax: [phone]

## 2018-11-01 NOTE — DISCHARGE NOTE OB - PATIENT PORTAL LINK FT
You can access the [facility] Patient Portal, offered by [facility], by registering with the following website: [email or web address]

## 2018-11-01 NOTE — PROGRESS NOTE ADULT - SUBJECTIVE AND OBJECTIVE BOX
Subjective:   Patient doing well. No complaints. Minimal lochia. Pain controlled.    Objective:   T(F): 97.6 ( @ 07:49), Max: 97.6 ( @ 07:49)  HR: 79 ( @ 07:49)  BP: 99/54 ( @ 07:49) (99/54 - 110/55)  RR: 18 ( @ 07:49)  SpO2: 100% ( @ :49) (100% - 100%)  Gen: AAOx3, NAD  Abd: Soft, Nontender, Nondistended, Fundus firm below the umbilicus  Ext: no tendern, mild edema  Min Lochia Rubra    Labs:                            7.7    6.49  )-----------( 220      ( 2018 11:03 )             24.9                         7.0    6.22  )-----------( 197      ( 2018 08:13 )             22.4                 Tolerating regular diet  Passed flatus, passed bowel movement  Breast/Bottle feeding    Assessment:   33y s/p , PPD#2, doing well    Plan:  -Routine postpartum care  -Encouraged ambulation and PO hydration  -Tolerating regular diet  -For Fe  -Precautions

## 2018-11-01 NOTE — DISCHARGE NOTE OB - CARE PLAN
Principal Discharge DX:	Vaginal delivery following previous  section, delivered  Goal:	healthy baby, healthy mom  Assessment and plan of treatment:	no sex, tampons 6 weeks

## 2018-11-01 NOTE — DISCHARGE NOTE OB - HOSPITAL COURSE
DATE OF DISCHARGE: 18 @ 07:12    HISTORY OF PRESENT ILLNESS/HOSPITAL COURSE: HPI:  33yo  at 41w1d GA presents with contractions x1 day, worse for the past few hours, now 10/10 intensity every few minutes.  Denies LOF, vaginal bleeding.  Reports good fetal movement.  Pt has h/o GDMA P5, negative GCT x2 this pregnancy.  H/o c/s with P1, largest infant.  VBACx4, desires TOLAC.  Pt prenatal sonogram at 28wks showed fetus measuring >97%ile, pt unsure if she has had a sonogram since that time. (30 Oct 2018 03:51)    PAST MEDICAL & SURGICAL HISTORY:  No pertinent past medical history  S/P appendectomy  Delivery by  section of full-term infant      PROCEDURES PERFORMED:     COMPLICATIONS:      POST PARTUM COURSE:       FINAL DIAGNOSIS:      LABS:                       7.7    10.25 )-----------( 182      ( 31 Oct 2018 07:18 )             25.7

## 2018-11-07 DIAGNOSIS — O34.219 MATERNAL CARE FOR UNSPECIFIED TYPE SCAR FROM PREVIOUS CESAREAN DELIVERY: ICD-10-CM

## 2018-11-07 DIAGNOSIS — O99.89 OTHER SPECIFIED DISEASES AND CONDITIONS COMPLICATING PREGNANCY, CHILDBIRTH AND THE PUERPERIUM: ICD-10-CM

## 2018-11-07 DIAGNOSIS — Z3A.41 41 WEEKS GESTATION OF PREGNANCY: ICD-10-CM

## 2018-11-07 DIAGNOSIS — O36.63X0 MATERNAL CARE FOR EXCESSIVE FETAL GROWTH, THIRD TRIMESTER, NOT APPLICABLE OR UNSPECIFIED: ICD-10-CM

## 2020-03-13 NOTE — OB RN TRIAGE NOTE - PAIN SCALE PREFERRED, PROFILE
Daily Progress Note:     Date of Service: 3/13/2020  Primary Team: [facility] IM Blue Team   Attending: Dr. Siegel  Senior Resident: Dr. Garza  Intern:   Contact:  [phone]    Chief Complaint:   Dyspnea    Subjective:  A 38-year-old male with PMHx of methamphetamine use has been admitted with chief complaint of dyspnea and was found to have sepsis likely secondary to RLL pneumonia and Streptococcus bacteremia.  He was transferred from ICU to wards on 3/9 in stable condition.  -Patient comfortable and responding more actively this a.m. and not somnolent, and responding well to questions and denying any complaints.  Patient afebrile with no acute events reported  -No acute events overnight, patient was afebrile  -Today a.m., patient was responsive and calm; denied any complaints.   -Tachycardic; other vital signs are within normal limits. Saturation is above 90% on room air  -On exam, mildly diminished breath sounds all over lung otherwise unremarkable; no nuchal rigidity or murmur on exam  -WBC is trending down; electrolytes and kidney functions are WNL; non-reactive HIV Ab test  -Blood culture 2/2 from 3/8/2020 are growing Viridans Streptococcus; will continue to follow  -repeat blood cultures 3/10/2020 are NGTD  -Flu negative  -IV ceftriaxone for 14 days to continue  -IV antibiotics for 14 days starting from 3/8/2020 to  3/17/2020  -We will monitor leukocytosis closely.  WBC count on 3/13/2020 is 17.8    Consultants/Specialty:  Critical care    Review of Systems:  Review of Systems   Constitutional: Negative for chills, fever, malaise/fatigue and weight loss.   HENT: Negative for congestion, ear pain, nosebleeds and sore throat.    Eyes: Negative for blurred vision, double vision, photophobia and pain.   Respiratory: Negative for cough, hemoptysis, sputum production and shortness of breath.    Cardiovascular: Negative for chest pain, palpitations, claudication and leg swelling.   Gastrointestinal: Negative  for abdominal pain, blood in stool, heartburn, nausea and vomiting.   Genitourinary: Negative for dysuria and hematuria.   Musculoskeletal: Negative for back pain, joint pain and myalgias.   Skin: Negative for itching and rash.   Neurological: Negative for dizziness, sensory change, speech change, focal weakness, seizures, loss of consciousness and headaches.   Psychiatric/Behavioral: Negative for depression and memory loss.       Objective Data:   Physical Exam:   Vitals:   Temp:  [36.1 °C (97 °F)-36.8 °C (98.3 °F)] 36.8 °C (98.2 °F)  Pulse:  [104-122] 114  Resp:  [16-19] 18  BP: (122-143)/() 142/101  SpO2:  [93 %-96 %] 96 %       Physical Exam  Constitutional:       General: He is not in acute distress.     Appearance: Normal appearance. He is normal weight. He is not ill-appearing, toxic-appearing or diaphoretic.      Comments: Non cooperative and sleeping most of the time   HENT:      Head: Normocephalic and atraumatic.      Nose: Nose normal. No congestion or rhinorrhea.      Mouth/Throat:      Pharynx: Oropharynx is clear. No oropharyngeal exudate or posterior oropharyngeal erythema.   Eyes:      Extraocular Movements: Extraocular movements intact.      Conjunctiva/sclera: Conjunctivae normal.      Pupils: Pupils are equal, round, and reactive to light.   Neck:      Musculoskeletal: Normal range of motion and neck supple. No neck rigidity.   Cardiovascular:      Rate and Rhythm: Normal rate and regular rhythm.      Pulses: Normal pulses.      Heart sounds: Normal heart sounds. No murmur. No friction rub. No gallop.    Pulmonary:      Effort: Pulmonary effort is normal. No respiratory distress.      Breath sounds: Normal breath sounds. No wheezing or rales.      Comments: Mildly diminished breath sounds  Abdominal:      General: Abdomen is flat. There is no distension.      Palpations: Abdomen is soft.      Tenderness: There is no abdominal tenderness. There is no guarding.   Musculoskeletal: Normal range  of motion.         General: No swelling, tenderness or deformity.   Lymphadenopathy:      Cervical: No cervical adenopathy.   Skin:     Findings: Lesion (2-3 cm sized, round, healing skin lesion aroung left ankle) present.   Neurological:      General: No focal deficit present.      Mental Status: He is alert.   Psychiatric:      Comments: Generally calm and sleeping; becomes irritated on asking questions.          Labs:     Recent Labs     03/11/20  0945 03/12/20  0800 03/13/20  0958   WBC 13.5* 17.8* 13.1*   RBC 5.63 5.78 5.77   HEMOGLOBIN 16.9 17.4 17.5   HEMATOCRIT 49.5 50.8 51.4   MCV 87.9 87.9 89.1   MCH 30.0 30.1 30.3   RDW 40.7 40.7 40.8   PLATELETCT 450* 496* 482*   MPV 8.7* 8.6* 8.5*   NEUTSPOLYS 77.30* 73.40*  --    LYMPHOCYTES 11.50* 11.80*  --    MONOCYTES 7.30 8.40  --    EOSINOPHILS 1.40 1.30  --    BASOPHILS 0.70 0.90  --      Recent Labs     03/11/20  0945 03/12/20  0800 03/13/20  0958   SODIUM 139 135 136   POTASSIUM 4.2 4.7 4.4   CHLORIDE 104 100 100   CO2 23 22 25   GLUCOSE 153* 134* 135*   BUN 13 15 18       Imaging:     CT-CHEST (THORAX) W/O   Final Result      1.  Extensive multifocal pneumonia primarily involving RIGHT upper and lower lobes.   2.  Small RIGHT pleural effusion.   3.  No pneumothorax.               DX-CHEST-PORTABLE (1 VIEW)   Final Result      Consolidation within the right lung base.          Assessment and Plan:    * Sepsis (HCC)- (present on admission)  Assessment & Plan  -Resolved   This is Severe Sepsis Present on admission  SIRS criteria identified on my evaluation include: Tachycardia, with heart rate greater than 90 BPM, Tachypnea, with respirations greater than 20 per minute and Leukocyosis, with WBC greater than 12,000  Source of infection is RLL pneumonia  Clinical indicators of end organ dysfunction include Lactate >2 mmol/L (18.0 mg/dL)  IV antibiotics as appropriate for source of sepsis  Reassessment: I have reassessed the patient's hemodynamic status  -Improved  "with antibiotics, fluids-tachycardia likely has component of methamphetamine intoxication as well.  - See \"RLL pneumonia\" for details.  -No significant overnight issues reported      Bacteremia- (present on admission)  Assessment & Plan  -3/8/2020 Bcx growing likely Strep species on preliminary results, likely secondary to Strep pneumonia.  -Patient endorses headache and stiff neck when asked, but exam benign, with no nuchal rigidity and negative Kernig's and Brudzinski's signs, alert and oriented x4, afebrile since admission.    -Patient also adamantly declined lumbar puncture when discussed, especially given likely strep species growing on blood cultures, patient understands risk of missing meningitis and possible death by foregoing lumbar puncture.  Given patient's strong preference, no evidence of meningitis on exam, and likelihood of CSF sterilization by antibiotics (initiated 3/8/2020 morning), will not pursue lumbar puncture or add vancomycin or dexamethasone at this time.  -No evidence of endocarditis, defer echo at this time.  -Blood culture 2/2 from 3/8/2020 are growing Viridans Streptococcus; will continue to follow  -repeat blood cultures 3/10/2020 are NGTD  -Continue ceftriaxone 2 g IV; 14-day course of ceftriaxone (3/8/2020-3/21/2020)    Lactic acidosis- (present on admission)  Assessment & Plan  -Resolved  -In setting of sepsis and acute respiratory failure.      Acute respiratory failure with hypoxia (HCC)- (present on admission)  Assessment & Plan  -Resolved  -In setting of community-acquired pneumonia.  -See \"RLL pneumonia\" for details.    RLL pneumonia (HCC)- (present on admission)  Assessment & Plan  -does not meet criteria for COVID screening  -Flu negative.  -Likely strep pneumonia given characteristic lobar consolidation on imaging, strep species growing on blood cultures.  -Incentive spirometry, supplemental oxygen as needed, RT per protocol.  -On ceftriaxone for concurrent pneumonia and " strep bacteremia (3/8/2020-3/17/2020), stopped azithromycin (3/8/2020-3/10/2020).  - Increased Ceftriaxone to 2 g daily for streptococcus viridans   IV-Ceftriaxone to continue for 14 days still 3/17/2020 from 3/8/2020    Tachycardia- (present on admission)  Assessment & Plan  -Sinus tachycardia in setting of sepsis syndrome and methamphetamine intoxication, last use night prior to presentation to ED 3/8/2020  -Monitor.    Methamphetamine abuse (HCC)- (present on admission)  Assessment & Plan  -Patient endorses smoking methamphetamine, last use night prior to presentation to ED 3/8/2020.  Denies past IV drug use or alcohol use.  -monitor for withdrawal symptoms.  -Counseled, to be continued as outpatient.    Leukocytosis- (present on admission)  Assessment & Plan  -Resolving   -In setting of sepsis, likely dehydration.  -Antibiotics, encourage oral intake.  -Monitor.      A computerized dictation system may have been used for this note.    Despite review, there may be some spelling or grammatical errors.  Joyce Garza M.D.   numerical 0-10

## 2020-06-15 NOTE — OB RN TRIAGE NOTE - NS_FETALMOVEMENT_OBGYN_ALL_OB
Present, unchanged Tranexamic Acid Pregnancy And Lactation Text: It is unknown if this medication is safe during pregnancy or breast feeding.

## 2020-12-30 ENCOUNTER — APPOINTMENT (OUTPATIENT)
Dept: BARIATRICS | Facility: CLINIC | Age: 35
End: 2020-12-30
Payer: MEDICAID

## 2020-12-30 VITALS — BODY MASS INDEX: 41.64 KG/M2 | HEIGHT: 63 IN | WEIGHT: 235 LBS

## 2020-12-30 DIAGNOSIS — D21.9 BENIGN NEOPLASM OF CONNECTIVE AND OTHER SOFT TISSUE, UNSPECIFIED: ICD-10-CM

## 2020-12-30 DIAGNOSIS — E03.9 HYPOTHYROIDISM, UNSPECIFIED: ICD-10-CM

## 2020-12-30 PROCEDURE — 99203 OFFICE O/P NEW LOW 30 MIN: CPT | Mod: 95

## 2020-12-30 NOTE — HISTORY OF PRESENT ILLNESS
[Home] : at home, [unfilled] , at the time of the visit. [Medical Office: ([facility])___] : at the medical office located in  [Verbal consent obtained from patient] : the patient, [unfilled] [de-identified] : Miriam Goldberger is a 34 y/o F with 6 children who has been followed by her PCP for weight loss who presents here today via phone consult. She is increasingly frustrated as she hasn't been able to lose weight. Was diagnosed with hypothyroidism and placed on thyroid medication. She was followed for several months and weight gain has stabilized but no significant weight loss. Currently only on Synthroid. Denies DM, GERD. Periods have become irregular. As she has been unable to sustain weight loss, has become interested in bariatric surgery. Today, over 30 minutes we discussed pros, cons and indications of bariatric surgery. She wants to go forward with sleeve gastrectomy as soon as possible. 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

## 2020-12-30 NOTE — END OF VISIT
[FreeTextEntry3] : All medical record entries made by the Scribe were at my, Dr. Payne's, discretion and personally dictated by me on 12/30/2020  . I have reviewed the chart and agree that the record accurately reflects my personal performance of the history, physical exam, assessment and plan. I have also personally directed, reviewed and agreed to the chart.

## 2020-12-30 NOTE — ADDENDUM
[FreeTextEntry1] : This note was written by Yvrose Mena on 12/30/2020  acting as scribe for Dr. Payne.

## 2021-01-24 ENCOUNTER — FORM ENCOUNTER (OUTPATIENT)
Age: 36
End: 2021-01-24

## 2021-01-25 ENCOUNTER — APPOINTMENT (OUTPATIENT)
Dept: BARIATRICS | Facility: CLINIC | Age: 36
End: 2021-01-25
Payer: MEDICAID

## 2021-01-25 PROCEDURE — 97802 MEDICAL NUTRITION INDIV IN: CPT

## 2021-01-25 PROCEDURE — 99072 ADDL SUPL MATRL&STAF TM PHE: CPT

## 2021-01-28 ENCOUNTER — APPOINTMENT (OUTPATIENT)
Dept: BARIATRICS | Facility: CLINIC | Age: 36
End: 2021-01-28
Payer: MEDICAID

## 2021-01-28 VITALS — WEIGHT: 235 LBS | HEIGHT: 63 IN | BODY MASS INDEX: 41.64 KG/M2

## 2021-01-28 PROCEDURE — 99443: CPT | Mod: 95

## 2021-01-31 ENCOUNTER — FORM ENCOUNTER (OUTPATIENT)
Age: 36
End: 2021-01-31

## 2021-02-02 ENCOUNTER — NON-APPOINTMENT (OUTPATIENT)
Age: 36
End: 2021-02-02

## 2021-03-03 NOTE — ASSESSMENT
[FreeTextEntry1] : She meets the NIH criteria for bariatric surgery and would like to have a laparoscopic sleeve gastrectomy. i have reviewed the risks and benefits of the procedure with the patient, and she understands this  information fully.

## 2021-03-03 NOTE — HISTORY OF PRESENT ILLNESS
[Home] : at home, [unfilled] , at the time of the visit. [Other Location: e.g. Home (Enter Location, City,State)___] : at [unfilled] [Verbal consent obtained from patient] : the patient, [unfilled] [de-identified] : Patient is a 35 year old female with a long history of morbid obesity not responsive to multiple dietary regimens. She has a BMI of 41.6 and is interested in weight loss surgery.

## 2021-04-12 ENCOUNTER — APPOINTMENT (OUTPATIENT)
Dept: BARIATRICS | Facility: HOSPITAL | Age: 36
End: 2021-04-12

## 2021-07-08 ENCOUNTER — ASOB RESULT (OUTPATIENT)
Age: 36
End: 2021-07-08

## 2021-07-08 ENCOUNTER — APPOINTMENT (OUTPATIENT)
Dept: ANTEPARTUM | Facility: CLINIC | Age: 36
End: 2021-07-08
Payer: MEDICAID

## 2021-07-08 PROCEDURE — 76811 OB US DETAILED SNGL FETUS: CPT

## 2021-09-02 ENCOUNTER — ASOB RESULT (OUTPATIENT)
Age: 36
End: 2021-09-02

## 2021-09-02 ENCOUNTER — APPOINTMENT (OUTPATIENT)
Dept: ANTEPARTUM | Facility: CLINIC | Age: 36
End: 2021-09-02
Payer: MEDICAID

## 2021-09-02 PROCEDURE — 76816 OB US FOLLOW-UP PER FETUS: CPT

## 2021-10-01 ENCOUNTER — OUTPATIENT (OUTPATIENT)
Dept: OUTPATIENT SERVICES | Age: 36
LOS: 1 days | Discharge: HOME | End: 2021-10-01

## 2021-10-01 DIAGNOSIS — Z90.49 ACQUIRED ABSENCE OF OTHER SPECIFIED PARTS OF DIGESTIVE TRACT: Chronic | ICD-10-CM

## 2021-10-04 ENCOUNTER — APPOINTMENT (OUTPATIENT)
Dept: PEDIATRIC CARDIOLOGY | Facility: CLINIC | Age: 36
End: 2021-10-04
Payer: MEDICAID

## 2021-10-04 PROCEDURE — 76825 ECHO EXAM OF FETAL HEART: CPT

## 2021-10-04 PROCEDURE — 99202 OFFICE O/P NEW SF 15 MIN: CPT

## 2021-10-04 PROCEDURE — 76821 MIDDLE CEREBRAL ARTERY ECHO: CPT

## 2021-10-04 PROCEDURE — 76827 ECHO EXAM OF FETAL HEART: CPT

## 2021-10-04 PROCEDURE — 76820 UMBILICAL ARTERY ECHO: CPT

## 2021-10-04 PROCEDURE — 93325 DOPPLER ECHO COLOR FLOW MAPG: CPT | Mod: 59

## 2021-10-05 ENCOUNTER — ASOB RESULT (OUTPATIENT)
Age: 36
End: 2021-10-05

## 2021-10-05 ENCOUNTER — APPOINTMENT (OUTPATIENT)
Dept: ANTEPARTUM | Facility: CLINIC | Age: 36
End: 2021-10-05
Payer: MEDICAID

## 2021-10-05 PROCEDURE — 76819 FETAL BIOPHYS PROFIL W/O NST: CPT

## 2021-10-05 PROCEDURE — 76816 OB US FOLLOW-UP PER FETUS: CPT

## 2021-10-14 ENCOUNTER — ASOB RESULT (OUTPATIENT)
Age: 36
End: 2021-10-14

## 2021-10-14 ENCOUNTER — APPOINTMENT (OUTPATIENT)
Dept: ANTEPARTUM | Facility: CLINIC | Age: 36
End: 2021-10-14
Payer: MEDICAID

## 2021-10-14 PROCEDURE — 76819 FETAL BIOPHYS PROFIL W/O NST: CPT

## 2021-10-21 ENCOUNTER — ASOB RESULT (OUTPATIENT)
Age: 36
End: 2021-10-21

## 2021-10-21 ENCOUNTER — APPOINTMENT (OUTPATIENT)
Dept: ANTEPARTUM | Facility: CLINIC | Age: 36
End: 2021-10-21
Payer: MEDICAID

## 2021-10-21 PROCEDURE — 76819 FETAL BIOPHYS PROFIL W/O NST: CPT

## 2021-10-28 ENCOUNTER — ASOB RESULT (OUTPATIENT)
Age: 36
End: 2021-10-28

## 2021-10-28 ENCOUNTER — APPOINTMENT (OUTPATIENT)
Dept: ANTEPARTUM | Facility: CLINIC | Age: 36
End: 2021-10-28
Payer: MEDICAID

## 2021-10-28 PROCEDURE — 76816 OB US FOLLOW-UP PER FETUS: CPT

## 2021-10-28 PROCEDURE — 76819 FETAL BIOPHYS PROFIL W/O NST: CPT

## 2021-11-02 ENCOUNTER — INPATIENT (INPATIENT)
Facility: HOSPITAL | Age: 36
LOS: 2 days | Discharge: HOME | End: 2021-11-05
Attending: OBSTETRICS & GYNECOLOGY | Admitting: OBSTETRICS & GYNECOLOGY
Payer: MEDICAID

## 2021-11-02 VITALS
TEMPERATURE: 98 F | HEIGHT: 63 IN | SYSTOLIC BLOOD PRESSURE: 111 MMHG | HEART RATE: 104 BPM | RESPIRATION RATE: 18 BRPM | DIASTOLIC BLOOD PRESSURE: 70 MMHG | WEIGHT: 235.01 LBS

## 2021-11-02 DIAGNOSIS — Z90.49 ACQUIRED ABSENCE OF OTHER SPECIFIED PARTS OF DIGESTIVE TRACT: Chronic | ICD-10-CM

## 2021-11-02 LAB
AMPHET UR-MCNC: NEGATIVE — SIGNIFICANT CHANGE UP
APPEARANCE UR: ABNORMAL
BACTERIA # UR AUTO: ABNORMAL
BARBITURATES UR SCN-MCNC: NEGATIVE — SIGNIFICANT CHANGE UP
BASOPHILS # BLD AUTO: 0.01 K/UL — SIGNIFICANT CHANGE UP (ref 0–0.2)
BASOPHILS NFR BLD AUTO: 0.1 % — SIGNIFICANT CHANGE UP (ref 0–1)
BENZODIAZ UR-MCNC: NEGATIVE — SIGNIFICANT CHANGE UP
BILIRUB UR-MCNC: NEGATIVE — SIGNIFICANT CHANGE UP
BLD GP AB SCN SERPL QL: SIGNIFICANT CHANGE UP
BUPRENORPHINE SCREEN, URINE RESULT: NEGATIVE — SIGNIFICANT CHANGE UP
COCAINE METAB.OTHER UR-MCNC: NEGATIVE — SIGNIFICANT CHANGE UP
COLOR SPEC: YELLOW — SIGNIFICANT CHANGE UP
DIFF PNL FLD: NEGATIVE — SIGNIFICANT CHANGE UP
EOSINOPHIL # BLD AUTO: 0.03 K/UL — SIGNIFICANT CHANGE UP (ref 0–0.7)
EOSINOPHIL NFR BLD AUTO: 0.4 % — SIGNIFICANT CHANGE UP (ref 0–8)
EPI CELLS # UR: 20 /HPF — HIGH (ref 0–5)
FENTANYL UR QL: NEGATIVE — SIGNIFICANT CHANGE UP
GLUCOSE BLDC GLUCOMTR-MCNC: 105 MG/DL — HIGH (ref 70–99)
GLUCOSE BLDC GLUCOMTR-MCNC: 110 MG/DL — HIGH (ref 70–99)
GLUCOSE BLDC GLUCOMTR-MCNC: 83 MG/DL — SIGNIFICANT CHANGE UP (ref 70–99)
GLUCOSE BLDC GLUCOMTR-MCNC: 87 MG/DL — SIGNIFICANT CHANGE UP (ref 70–99)
GLUCOSE BLDC GLUCOMTR-MCNC: 87 MG/DL — SIGNIFICANT CHANGE UP (ref 70–99)
GLUCOSE BLDC GLUCOMTR-MCNC: 91 MG/DL — SIGNIFICANT CHANGE UP (ref 70–99)
GLUCOSE UR QL: NEGATIVE — SIGNIFICANT CHANGE UP
HCT VFR BLD CALC: 31.3 % — LOW (ref 37–47)
HGB BLD-MCNC: 9.9 G/DL — LOW (ref 12–16)
HYALINE CASTS # UR AUTO: 29 /LPF — HIGH (ref 0–7)
IMM GRANULOCYTES NFR BLD AUTO: 0.6 % — HIGH (ref 0.1–0.3)
KETONES UR-MCNC: ABNORMAL
L&D DRUG SCREEN, URINE: SIGNIFICANT CHANGE UP
LEUKOCYTE ESTERASE UR-ACNC: ABNORMAL
LYMPHOCYTES # BLD AUTO: 1.87 K/UL — SIGNIFICANT CHANGE UP (ref 1.2–3.4)
LYMPHOCYTES # BLD AUTO: 26.2 % — SIGNIFICANT CHANGE UP (ref 20.5–51.1)
MCHC RBC-ENTMCNC: 25.3 PG — LOW (ref 27–31)
MCHC RBC-ENTMCNC: 31.6 G/DL — LOW (ref 32–37)
MCV RBC AUTO: 79.8 FL — LOW (ref 81–99)
METHADONE UR-MCNC: NEGATIVE — SIGNIFICANT CHANGE UP
MONOCYTES # BLD AUTO: 0.63 K/UL — HIGH (ref 0.1–0.6)
MONOCYTES NFR BLD AUTO: 8.8 % — SIGNIFICANT CHANGE UP (ref 1.7–9.3)
NEUTROPHILS # BLD AUTO: 4.57 K/UL — SIGNIFICANT CHANGE UP (ref 1.4–6.5)
NEUTROPHILS NFR BLD AUTO: 63.9 % — SIGNIFICANT CHANGE UP (ref 42.2–75.2)
NITRITE UR-MCNC: NEGATIVE — SIGNIFICANT CHANGE UP
NRBC # BLD: 0 /100 WBCS — SIGNIFICANT CHANGE UP (ref 0–0)
OPIATES UR-MCNC: NEGATIVE — SIGNIFICANT CHANGE UP
OXYCODONE UR-MCNC: NEGATIVE — SIGNIFICANT CHANGE UP
PCP UR-MCNC: NEGATIVE — SIGNIFICANT CHANGE UP
PH UR: 6 — SIGNIFICANT CHANGE UP (ref 5–8)
PLATELET # BLD AUTO: 208 K/UL — SIGNIFICANT CHANGE UP (ref 130–400)
PRENATAL SYPHILIS TEST: SIGNIFICANT CHANGE UP
PROPOXYPHENE QUALITATIVE URINE RESULT: NEGATIVE — SIGNIFICANT CHANGE UP
PROT UR-MCNC: ABNORMAL
RBC # BLD: 3.92 M/UL — LOW (ref 4.2–5.4)
RBC # FLD: 15.4 % — HIGH (ref 11.5–14.5)
RBC CASTS # UR COMP ASSIST: 4 /HPF — SIGNIFICANT CHANGE UP (ref 0–4)
SARS-COV-2 RNA SPEC QL NAA+PROBE: SIGNIFICANT CHANGE UP
SP GR SPEC: 1.03 — HIGH (ref 1.01–1.03)
UROBILINOGEN FLD QL: SIGNIFICANT CHANGE UP
WBC # BLD: 7.15 K/UL — SIGNIFICANT CHANGE UP (ref 4.8–10.8)
WBC # FLD AUTO: 7.15 K/UL — SIGNIFICANT CHANGE UP (ref 4.8–10.8)
WBC UR QL: 57 /HPF — HIGH (ref 0–5)

## 2021-11-02 RX ORDER — LEVOTHYROXINE SODIUM 125 MCG
75 TABLET ORAL DAILY
Refills: 0 | Status: DISCONTINUED | OUTPATIENT
Start: 2021-11-02 | End: 2021-11-04

## 2021-11-02 RX ORDER — ONDANSETRON 8 MG/1
4 TABLET, FILM COATED ORAL EVERY 6 HOURS
Refills: 0 | Status: DISCONTINUED | OUTPATIENT
Start: 2021-11-02 | End: 2021-11-05

## 2021-11-02 RX ORDER — DEXAMETHASONE 0.5 MG/5ML
4 ELIXIR ORAL EVERY 6 HOURS
Refills: 0 | Status: DISCONTINUED | OUTPATIENT
Start: 2021-11-02 | End: 2021-11-05

## 2021-11-02 RX ORDER — OXYTOCIN 10 UNIT/ML
1 VIAL (ML) INJECTION
Qty: 30 | Refills: 0 | Status: DISCONTINUED | OUTPATIENT
Start: 2021-11-02 | End: 2021-11-03

## 2021-11-02 RX ORDER — NALOXONE HYDROCHLORIDE 4 MG/.1ML
0.1 SPRAY NASAL
Refills: 0 | Status: DISCONTINUED | OUTPATIENT
Start: 2021-11-02 | End: 2021-11-05

## 2021-11-02 RX ORDER — OXYTOCIN 10 UNIT/ML
333.33 VIAL (ML) INJECTION
Qty: 20 | Refills: 0 | Status: DISCONTINUED | OUTPATIENT
Start: 2021-11-02 | End: 2021-11-03

## 2021-11-02 RX ORDER — INFLUENZA VIRUS VACCINE 15; 15; 15; 15 UG/.5ML; UG/.5ML; UG/.5ML; UG/.5ML
0.5 SUSPENSION INTRAMUSCULAR ONCE
Refills: 0 | Status: COMPLETED | OUTPATIENT
Start: 2021-11-02 | End: 2021-11-02

## 2021-11-02 RX ORDER — SODIUM CHLORIDE 9 MG/ML
1000 INJECTION, SOLUTION INTRAVENOUS
Refills: 0 | Status: DISCONTINUED | OUTPATIENT
Start: 2021-11-02 | End: 2021-11-03

## 2021-11-02 RX ORDER — FENTANYL/BUPIVACAINE/NS/PF 2MCG/ML-.1
250 PLASTIC BAG, INJECTION (ML) INJECTION
Refills: 0 | Status: DISCONTINUED | OUTPATIENT
Start: 2021-11-02 | End: 2021-11-03

## 2021-11-02 RX ADMIN — Medication 1 MILLIUNIT(S)/MIN: at 04:32

## 2021-11-02 RX ADMIN — Medication 75 MICROGRAM(S): at 06:01

## 2021-11-02 RX ADMIN — SODIUM CHLORIDE 125 MILLILITER(S): 9 INJECTION, SOLUTION INTRAVENOUS at 03:20

## 2021-11-02 NOTE — OB PROVIDER H&P - NSHPLABSRESULTS_GEN_ALL_CORE
GTT not done  HbA1c 5.9 (04/13/21)    Sonograms  20w4d: vertex, anterior low lying, NANO wnl, 373gm  28w4d: vertex, anterior no previa, MVP 4.17cm, BPP 8/8, 1545gm (93%)  33w2d: vertex, anterior no previa, MVP 6.79cm, BPP 8/8, 2781gm (98%), AC>99%, suspected intraabdominal umbilical vein varix measuring 1.3cm, no evidence of thrombosis  34w4d: vertex, anterior, MVP 5.93cm, BPP 8/8, umbilica, vein varix measuring 1.45cm, no evidence of thrombosis, fetal echocardiogram  35w4d: vertex, anterior, MVP 7.86cm, BPP 8/8, umbilical vein varix measuring 1.47cm, no evidence of thrombosis  36w4d: vertex, anterior, MVP 5.92cm, BPP 8/8, 3342gm (86%), AC 94%, umbilical vein varix 1.26cm   GTT not done  HbA1c 5.9 (04/13/21)    Sonograms  20w4d: vertex, anterior low lying, NANO wnl, 373gm  28w4d: vertex, anterior no previa, MVP 4.17cm, BPP 8/8, 1545gm (93%)  33w2d: vertex, anterior no previa, MVP 6.79cm, BPP 8/8, 2781gm (98%), AC>99%, suspected intraabdominal umbilical vein varix measuring 1.3cm, no evidence of thrombosis  34w4d: vertex, anterior, MVP 5.93cm, BPP 8/8, umbilical vein varix measuring 1.45cm, no evidence of thrombosis, fetal echocardiogram normal  35w4d: vertex, anterior, MVP 7.86cm, BPP 8/8, umbilical vein varix measuring 1.47cm, no evidence of thrombosis  36w4d: vertex, anterior, MVP 5.92cm, BPP 8/8, 3342gm (86%), AC 94%, umbilical vein varix 1.26cm

## 2021-11-02 NOTE — OB PROVIDER H&P - NSHPPHYSICALEXAM_GEN_ALL_CORE
Physical exam:    Vital Signs Last 24 Hrs  HR: 104 (02 Nov 2021 02:23) (104 - 104)  BP: 111/70 (02 Nov 2021 02:23) (111/70 - 111/70)  RR: 18 (02 Nov 2021 02:17) (18 - 18)        Gen: NAD  Abdomen: soft, gravid, nontender, with nonpalpable contractions    EFM: 130/mod/pos acc  toco: irritability  SVE: deferred Physical exam:    Vital Signs Last 24 Hrs  T 37C  HR: 104 (02 Nov 2021 02:23) (104 - 104)  BP: 111/70 (02 Nov 2021 02:23) (111/70 - 111/70)  RR: 18 (02 Nov 2021 02:17) (18 - 18)        Gen: NAD  Abdomen: soft, gravid, nontender, with nonpalpable contractions    EFM: 130/mod/pos acc  toco: irritability  SVE: deferred

## 2021-11-02 NOTE — OB RN PATIENT PROFILE - CURRENT PREGNANCY COMPLICATIONS, OB PROFILE
Hospitalist Progress Note    Patient: Mega Fernández MRN: [id number]  [facility]: [id number]    YOB: 1973  Age: 37 y.o. Sex: female    DOA: 3/27/2017 LOS:  LOS: 13 days                Assessment and Plan:    Principal Problem:    GI bleed (3/28/2017)    Active Problems:    Crohn disease ([facility] (1/57/1264)      Embolic stroke ([facility] (2/59/1830)      Acute blood loss anemia (3/15/2017)      Neuropathic pain (3/15/2017)      DVT (deep venous thrombosis) ([facility]) (3/16/2017)      Elevated WBC count (3/28/2017)      PAD (peripheral artery disease) ([facility] (4/4/2017)      Vascular abnormality (4/6/2017)        Recent admission for embolic CVA and recent DVT.     GI bleed - GI was following, appears to be stable. If active bleeding may need colectomy       Anemia - acute blood loss, s/p blood transfusion, hemoglobin is relatively stable. Drifted down to 9.3 this am      Recent DVT/CVA - anticoagulation restarted. On heparin drip with transition to coumadin        Right leg pain - right leg arterial duplex >75%stenosis of anterior tibial artery. Vascular following, CTA with right popliteal occlusion, s/p RIGHT LEG THROMBECTOMY W/REPAIR POPLITEAL ARTERY W/C-ARM,ANGIOGRAM by Dr. Moises Cruz. Postoperative management per vascular surgery. We are trying to get her stable on po pain meds. She is still  requiring it.      Crohn's disease - management per GI. On steroids, mesalamine and protonix. Seen by colorectal surgery, bleeding stopped, if patient bleeds again she is a candidate of colectomy.       HTN - controlled. Chief complaint:  GI bleed, right leg pain       Subjective:    Leg at surgery site hurts. No shortness of breath or chest pain       Review of systems:    General: No fevers or chills. Cardiovascular: No chest pain or pressure. No palpitations. Pulmonary: No shortness of breath. Gastrointestinal: No nausea, vomiting.      Objective:    Vital signs/Intake and Output:  Visit Vitals    BP 119/61 (BP 1 Location: Left arm, BP Patient Position: At rest)    Pulse 92    Temp 98 °F (36.7 °C)    Resp 18    Ht 5' 6\" (1.676 m)    Wt 90.3 kg (199 lb 1.2 oz)    SpO2 100%    Breastfeeding No    BMI 32.13 kg/m2     Current Shift:  04/09 0701 - 04/09 1900  In: 1951 [P.O.:255; I.V.:1696]  Out: -   Last three shifts:  04/07 1901 - 04/09 0700  In: 640.1 [P.O.:480; I.V.:160.1]  Out: 2500 [Urine:2500]    Physical Exam:  General: NAD, AAOx3. Non-toxic. HEENT: NC/AT. PERRLA, EOMI.  MMM. Lungs: Nml inspection. CTA B/L. No wheezing, rales or rhonchi. Heart:  S1S2 RRR,  PMI mid 5th IC space. No M/RG. Abdomen: Soft, NT/ND.  BS+. No peritoneal signs. Extremities: No C/C/E. Psych:   Nml affect. Neurologic:  2-12 intact. Strength 5/5 throughout. Sensation symmetrical.          Labs: Results:       Chemistry Recent Labs      04/09/17   0133  04/08/17   1314   GLU  113*  130*   NA  143  142   K  3.6  4.3   CL  107  104   CO2  30  30   BUN  16  11   CREA  0.69  0.54*   CA  8.4*  8.6   AGAP  6  8   BUCR  23*  20      CBC w/Diff Recent Labs      04/09/17   0133  04/08/17   0545  04/07/17   0245   WBC  8.6  8.3  10.4   RBC  3.28*  3.49*  3.52*   HGB  9.3*  9.9*  10.0*   HCT  29.3*  31.0*  31.0*   PLT  259  252  287   GRANS  69  66   --    LYMPH  18*  21   --    EOS  3  3   --       Cardiac Enzymes No results for input(s): CPK, CKND1, CHANDA in the last 72 hours.     No lab exists for component: CKRMB, TROIP   Coagulation Recent Labs      04/09/17   0133  04/08/17   1314  04/08/17   0545   PTP  12.0   --   11.7   INR  0.9   --   0.9   APTT  33.2  30.1   --        Lipid Panel Lab Results   Component Value Date/Time    Cholesterol, total 145 03/12/2017 06:15 AM    HDL Cholesterol 37 03/12/2017 06:15 AM    LDL, calculated 91.6 03/12/2017 06:15 AM    VLDL, calculated 16.4 03/12/2017 06:15 AM    Triglyceride 82 03/12/2017 06:15 AM    CHOL/HDL Ratio 3.9 03/12/2017 06:15 AM      BNP No results for input(s): BNPP in the last 72 hours.   Liver Enzymes No results for input(s): TP, ALB, TBIL, AP, SGOT, GPT in the last 72 hours.     No lab exists for component: DBIL   Thyroid Studies Lab Results   Component Value Date/Time    TSH 1.51 12/12/2009 09:48 AM        Procedures/imaging: see electronic medical records for all procedures/Xrays and details which were not copied into this note but were reviewed prior to creation of Plan Gestational Diabetes

## 2021-11-02 NOTE — PROCEDURE NOTE - NSINFORMCONSENT_GEN_A_CORE
Obtained at 0835/Benefits, risks, and possible complications of procedure explained to patient/caregiver who verbalized understanding and gave written consent.

## 2021-11-02 NOTE — PROGRESS NOTE ADULT - ASSESSMENT
A/P: 37 yo  @37w2d, GBS neg, fetal macrosomia with EFW 86% and AC 94%, h/o hypothyroidism, h/o c/s x1 followed by VBACx5, TOLAC, IOL for poorly controlled pregestational diabetes vs GDMA2, Metformin 500mg BID, abnormal labor course    - anesthesia called to redo epidural  - Cont EFM/[place]  - IV Hydration  - Pain Management prn, epidural in place  - Monitor vitals  - Clear Liquids  - POCT blood glucose q4h in latent labor, q2h in active labor  - Cont pitocin 1x1, currently on 12U    Dr Madi horne and Dr Felix bedside

## 2021-11-02 NOTE — PROGRESS NOTE ADULT - ASSESSMENT
37 yo  @37w2d, GBS neg, fetal macrosomia with EFW 86% and AC 94%, h/o hypothyroidism, h/o c/s x1 followed by VBACx5, TOLAC, IOL for poorly controlled pregestational diabetes vs GDMA2, doing well.     - Cont EFM/[place]  - IV Hydration  - Pain Management prn  - Monitor vitals  - Clear Liquids  - POCT blood glucose q4h in latent labor, q2h in active labor  - Cont pitocin 1x1    Dr Beckett and Dr Felix aware.   35 yo  @37w2d, GBS neg, fetal macrosomia with EFW 86% and AC 94%, h/o hypothyroidism, h/o c/s x1 followed by VBACx5, TOLAC, IOL for poorly controlled pregestational diabetes vs GDMA2, Metformin 500mg BID, doing well.     - Cont EFM/[place]  - IV Hydration  - Pain Management prn  - Monitor vitals  - Clear Liquids  - POCT blood glucose q4h in latent labor, q2h in active labor  - Cont pitocin 1x1    Dr Beckett and Dr Felix aware.

## 2021-11-02 NOTE — PROCEDURE NOTE - ADDITIONAL PROCEDURE DETAILS
Post Procedure Patient evaluated at bedside.  Hemodynamically stable, degree of motor block appropriate for epidural medication administered. Pain is well controlled bilaterally. Patient is aware that the anesthesia team is available 24/7, in case she needs more pain medication or has any other anesthesia needs or questions.     .1% Bupivacaine running at 15ML/HR Post Procedure Patient evaluated at bedside.  Hemodynamically stable, degree of motor block appropriate for epidural medication administered. Pain is well controlled bilaterally. Patient is aware that the anesthesia team is available 24/7, in case she needs more pain medication or has any other anesthesia needs or questions.     .1% Bupivacaine running at 15ML/HR    16:48 - 10 mL of bupivacaine 0.125% administered via epidural catheter Post Procedure Patient evaluated at bedside.  Hemodynamically stable, degree of motor block appropriate for epidural medication administered. Pain is well controlled bilaterally. Patient is aware that the anesthesia team is available 24/7, in case she needs more pain medication or has any other anesthesia needs or questions.     .1% Bupivacaine running at 15ML/HR    16:48 - 10 mL of bupivacaine 0.125% administered via epidural catheter  23.45pm Top off given to pt. 100mcg fentanyl with 5cc .25% bupivacaine.  VSS post top off. Post Procedure Patient evaluated at bedside.  Hemodynamically stable, degree of motor block appropriate for epidural medication administered. Pain is well controlled bilaterally. Patient is aware that the anesthesia team is available 24/7, in case she needs more pain medication or has any other anesthesia needs or questions.     .1% Bupivacaine running at 15ML/HR    16:48 - 10 mL of bupivacaine 0.125% administered via epidural catheter  23.45pm Top off given to pt. 100mcg fentanyl with 5cc .25% bupivacaine.  VSS post top off.  00.20pm New epidural bag replaced . Post Procedure Patient evaluated at bedside.  Hemodynamically stable, degree of motor block appropriate for epidural medication administered. Pain is well controlled bilaterally. Patient is aware that the anesthesia team is available , in case she needs more pain medication or has any other anesthesia needs or questions.     .1% Bupivacaine running at 15ML/HR    16:48 - 10 mL of bupivacaine 0.125% administered via epidural catheter  23.45pm Top off given to pt. 100mcg fentanyl with 5cc .25% bupivacaine.  VSS post top off.  00.20pm New epidural bag replaced .  02.55am Patient taken to OR for  for arrest of descent

## 2021-11-02 NOTE — PROGRESS NOTE ADULT - ASSESSMENT
37 yo  @37w2d, GBS neg, fetal macrosomia with EFW 86% and AC 94%, h/o hypothyroidism, h/o c/s x1 followed by VBACx5, IOL for poorly controlled pregestational diabetes vs GDMA2, doing well.     - Cont EFM/[place]  - IV Hydration  - Pain Management prn, s/p epidural  - Monitor vitals  - Clear liquid diet  - Continue pitocin 1x1    Dr Beckett and Dr Macias to be made aware.

## 2021-11-02 NOTE — PROGRESS NOTE ADULT - ASSESSMENT
35 yo  @37w2d, GBS neg, fetal macrosomia with EFW 86% and AC 94%, h/o hypothyroidism, h/o c/s x1 followed by VBACx5, TOLAC, IOL for poorly controlled pregestational diabetes vs GDMA2, Metformin 500mg BID, doing well.     - Cont EFM/[place]  - IV Hydration  - Pain Management prn, epidural in place  - Monitor vitals  - Clear Liquids  - POCT blood glucose q4h in latent labor, q2h in active labor  - Cont pitocin 1x1, currently on 10U    Dr Beckett and Dr Felix to be made aware.

## 2021-11-02 NOTE — OB PROVIDER H&P - NS_OBGYNHISTORY_OBGYN_ALL_OB_FT
OB Hx: c/s x1 arrest of descent, 9lb1oz, VBACx45 largest 2xg55cs, h/o GDMA1 P5, h/o open appendectomy in pregnancy (cannot remember which pregnancy)  Gyn Hx: denies h/o abnormal PAP, ovarian cysts, STIs, uterine fibroids OB Hx: c/s x1 arrest of descent, 9lb1oz, VBACx5 largest 12-5oz, h/o GDMA1 P5, h/o open appendectomy in P2  Gyn Hx: denies h/o abnormal PAP, ovarian cysts, STIs, uterine fibroids

## 2021-11-02 NOTE — OB PROVIDER H&P - ASSESSMENT
A/P: 37 yo  @37w2d, GBS neg, fetal macrosomia with EFW 86% and AC 94%, h/o hypothyroidism, h/o c/s x1, VBACx5, IOL for poorly controlled pregestational diabetes vs GDMA1  -admit to l&d  -f/u admission labs  -monitor vitals  -cont toco/efm monitoring  -fs q4 in latent labor, q2 in active labor  -pain management prn    Dr. Barraza and Dr. Macias available   A/P: 37 yo  @37w2d, GBS neg, fetal macrosomia with EFW 86% and AC 94%, h/o hypothyroidism, h/o c/s x1, VBACx5, IOL for poorly controlled pregestational diabetes vs GDMA2  -admit to l&d  -f/u admission labs  -monitor vitals  -cont toco/efm monitoring  -fs q4 in latent labor, q2 in active labor  -pain management prn    Dr. Barraza and Dr. Macias available   A/P: 35 yo  @37w2d, GBS neg, fetal macrosomia with EFW 86% and AC 94%, h/o hypothyroidism, h/o c/s x1, VBACx5, IOL for poorly controlled pregestational diabetes vs GDMA2  -admit to l&d  -f/u admission labs  -monitor vitals  -cont toco/efm monitoring  -fs q4 in latent labor, q2 in active labor  -pain management prn    Dr. Barraza and Dr. Macias aware

## 2021-11-02 NOTE — OB PROVIDER H&P - ATTENDING COMMENTS
35 y/o [id number] at 37.2 wks presents for elective induction of labor secondary to diabetes and h/o macrosomia with shoulder dystocia.  Good fm, no rom, no bleeding.    pnc: class a2 dm, fair control   then vdac x 5, largest 12+ lbs  macrosomia, shoulder dystocia    abd: p=9221 g, nt  ext: no edema  cx: 2/50/-2/i/adeq  nst: reactive  toco: irreg ctx    admit, discussed induction in light of multiparity and prior , ques answered, strongly desires vtol, for pitocin, r/b/a rev, ques answered  anticipate vdac

## 2021-11-02 NOTE — OB PROVIDER H&P - HISTORY OF PRESENT ILLNESS
37 yo  @37w2d is admitted for IOL for poorly controlled pregestation diabetes vs GDMA1. Patient has a history of hypothyroidism and History of c/s1 for failure of descent and VBACx5. GBS negative. 37 yo  @37w2d, ZENAIDA 21 dated by first trimester sono, is admitted for IOL for poorly controlled pregestational diabetes vs GDMA1. Reports irregular contractions of 5/10 intensity for the past few weeks. Denies leakage of fluid, vaginal bleeding. Reports good fetal movement. Fasting fingersticks are between , 2 hour postprandial fingersticks are between 120-140. Patient takes metformin 500mg BID. Pregnancy is complicated by fetal macrosomia, last sonogram EFW 86%, AC 94%. Patient has a history of hypothyroidism, on 75mcg synthroid, same as her prepregnancy dose. History of c/s1 for arrest of descent, VBACx5. She was checked in the office yesterday morning and was 1.5cm dilated. GBS negative. 37 yo  @37w2d, ZENAIDA 21 dated by first trimester sono, is admitted for IOL for poorly controlled pregestational diabetes vs GDMA2. Reports irregular contractions of 5/10 intensity for the past few weeks. Denies leakage of fluid, vaginal bleeding. Reports good fetal movement. Fasting fingersticks are between , 2 hour postprandial fingersticks are between 120-140. Patient takes metformin 500mg BID. Pregnancy is complicated by fetal macrosomia, last sonogram EFW 86%, AC 94%. Patient has a history of hypothyroidism, on 75mcg synthroid, same as her prepregnancy dose. History of c/s1 for arrest of descent, VBACx5. She was checked in the office yesterday morning and was 1.5cm dilated. GBS negative. 37 yo  @37w2d, ZENAIDA 21 dated by first trimester sono, is admitted for IOL for poorly controlled pregestational diabetes vs GDMA2. Reports irregular contractions of 5/10 intensity for the past few weeks. Denies leakage of fluid, vaginal bleeding. Reports good fetal movement. Fasting fingersticks are between , 2 hour postprandial fingersticks are between 120-140. Patient takes metformin 500mg BID. Pregnancy is complicated by fetal macrosomia, last sonogram EFW 86%, AC 94%. Patient has a history of hypothyroidism, on 75mcg synthroid, same as her prepregnancy dose. History of c/sx1 for arrest of descent, VBACx5. She was checked in the office yesterday morning and was 1.5cm dilated. GBS negative.

## 2021-11-02 NOTE — OB RN PATIENT PROFILE - NS_OBGYNHISTORY_OBGYN_ALL_OB_FT
OB Hx: c/s x1 arrest of descent, 9lb1oz, VBACx45 largest 2kb07sl, h/o GDMA1 P5, h/o open appendectomy in pregnancy (cannot remember which pregnancy)  Gyn Hx: denies h/o abnormal PAP, ovarian cysts, STIs, uterine fibroids

## 2021-11-02 NOTE — OB PROVIDER H&P - NSPPHRISKEVAL_OBGYN_ALL_OB
Grand parity (>4 full term pregnancies)/Over distended uterus (polyhydramnios, macrosomia, multiple gestation)/Prior , uterine surgery, or multiple laparotomies

## 2021-11-02 NOTE — OB RN PATIENT PROFILE - NSRUBEOLARESULTS_OBGYN_ALL_OB
Archer Malinda called to request a refill on her medication.       Last office visit : 3/15/2021   Next office visit : 7/19/2021     Requested Prescriptions     Pending Prescriptions Disp Refills    SYNTHROID 50 MCG tablet 30 tablet 2     Sig: Take 1 tablet by mouth Daily            Nancy Cantu MA immune

## 2021-11-03 ENCOUNTER — RESULT REVIEW (OUTPATIENT)
Age: 36
End: 2021-11-03

## 2021-11-03 LAB
BASOPHILS # BLD AUTO: 0.02 K/UL — SIGNIFICANT CHANGE UP (ref 0–0.2)
BASOPHILS NFR BLD AUTO: 0.1 % — SIGNIFICANT CHANGE UP (ref 0–1)
EOSINOPHIL # BLD AUTO: 0.05 K/UL — SIGNIFICANT CHANGE UP (ref 0–0.7)
EOSINOPHIL NFR BLD AUTO: 0.4 % — SIGNIFICANT CHANGE UP (ref 0–8)
GLUCOSE BLDC GLUCOMTR-MCNC: 126 MG/DL — HIGH (ref 70–99)
GLUCOSE BLDC GLUCOMTR-MCNC: 152 MG/DL — HIGH (ref 70–99)
HCT VFR BLD CALC: 30.3 % — LOW (ref 37–47)
HGB BLD-MCNC: 9.4 G/DL — LOW (ref 12–16)
IMM GRANULOCYTES NFR BLD AUTO: 0.3 % — SIGNIFICANT CHANGE UP (ref 0.1–0.3)
LYMPHOCYTES # BLD AUTO: 1.54 K/UL — SIGNIFICANT CHANGE UP (ref 1.2–3.4)
LYMPHOCYTES # BLD AUTO: 11.4 % — LOW (ref 20.5–51.1)
MCHC RBC-ENTMCNC: 24.9 PG — LOW (ref 27–31)
MCHC RBC-ENTMCNC: 31 G/DL — LOW (ref 32–37)
MCV RBC AUTO: 80.2 FL — LOW (ref 81–99)
MONOCYTES # BLD AUTO: 0.95 K/UL — HIGH (ref 0.1–0.6)
MONOCYTES NFR BLD AUTO: 7 % — SIGNIFICANT CHANGE UP (ref 1.7–9.3)
NEUTROPHILS # BLD AUTO: 10.89 K/UL — HIGH (ref 1.4–6.5)
NEUTROPHILS NFR BLD AUTO: 80.8 % — HIGH (ref 42.2–75.2)
NRBC # BLD: 0 /100 WBCS — SIGNIFICANT CHANGE UP (ref 0–0)
PLATELET # BLD AUTO: 230 K/UL — SIGNIFICANT CHANGE UP (ref 130–400)
RBC # BLD: 3.78 M/UL — LOW (ref 4.2–5.4)
RBC # FLD: 15.7 % — HIGH (ref 11.5–14.5)
WBC # BLD: 13.49 K/UL — HIGH (ref 4.8–10.8)
WBC # FLD AUTO: 13.49 K/UL — HIGH (ref 4.8–10.8)

## 2021-11-03 PROCEDURE — 88307 TISSUE EXAM BY PATHOLOGIST: CPT | Mod: 26

## 2021-11-03 PROCEDURE — 59514 CESAREAN DELIVERY ONLY: CPT | Mod: U7

## 2021-11-03 RX ORDER — OXYCODONE HYDROCHLORIDE 5 MG/1
5 TABLET ORAL
Refills: 0 | Status: DISCONTINUED | OUTPATIENT
Start: 2021-11-03 | End: 2021-11-03

## 2021-11-03 RX ORDER — DIPHENHYDRAMINE HCL 50 MG
25 CAPSULE ORAL EVERY 6 HOURS
Refills: 0 | Status: DISCONTINUED | OUTPATIENT
Start: 2021-11-03 | End: 2021-11-05

## 2021-11-03 RX ORDER — CEFAZOLIN SODIUM 1 G
2000 VIAL (EA) INJECTION ONCE
Refills: 0 | Status: COMPLETED | OUTPATIENT
Start: 2021-11-03 | End: 2021-11-03

## 2021-11-03 RX ORDER — SODIUM CHLORIDE 9 MG/ML
1000 INJECTION, SOLUTION INTRAVENOUS
Refills: 0 | Status: DISCONTINUED | OUTPATIENT
Start: 2021-11-03 | End: 2021-11-05

## 2021-11-03 RX ORDER — ENOXAPARIN SODIUM 100 MG/ML
40 INJECTION SUBCUTANEOUS AT BEDTIME
Refills: 0 | Status: DISCONTINUED | OUTPATIENT
Start: 2021-11-03 | End: 2021-11-05

## 2021-11-03 RX ORDER — OXYCODONE HYDROCHLORIDE 5 MG/1
5 TABLET ORAL ONCE
Refills: 0 | Status: DISCONTINUED | OUTPATIENT
Start: 2021-11-03 | End: 2021-11-05

## 2021-11-03 RX ORDER — IBUPROFEN 200 MG
600 TABLET ORAL EVERY 6 HOURS
Refills: 0 | Status: DISCONTINUED | OUTPATIENT
Start: 2021-11-03 | End: 2021-11-03

## 2021-11-03 RX ORDER — KETOROLAC TROMETHAMINE 30 MG/ML
30 SYRINGE (ML) INJECTION EVERY 6 HOURS
Refills: 0 | Status: COMPLETED | OUTPATIENT
Start: 2021-11-03 | End: 2021-11-04

## 2021-11-03 RX ORDER — LEVOTHYROXINE SODIUM 125 MCG
75 TABLET ORAL DAILY
Refills: 0 | Status: DISCONTINUED | OUTPATIENT
Start: 2021-11-03 | End: 2021-11-05

## 2021-11-03 RX ORDER — MORPHINE SULFATE 50 MG/1
0.25 CAPSULE, EXTENDED RELEASE ORAL ONCE
Refills: 0 | Status: DISCONTINUED | OUTPATIENT
Start: 2021-11-03 | End: 2021-11-03

## 2021-11-03 RX ORDER — ACETAMINOPHEN 500 MG
975 TABLET ORAL
Refills: 0 | Status: DISCONTINUED | OUTPATIENT
Start: 2021-11-03 | End: 2021-11-05

## 2021-11-03 RX ORDER — MAGNESIUM HYDROXIDE 400 MG/1
30 TABLET, CHEWABLE ORAL
Refills: 0 | Status: DISCONTINUED | OUTPATIENT
Start: 2021-11-03 | End: 2021-11-05

## 2021-11-03 RX ORDER — OXYCODONE HYDROCHLORIDE 5 MG/1
5 TABLET ORAL ONCE
Refills: 0 | Status: DISCONTINUED | OUTPATIENT
Start: 2021-11-03 | End: 2021-11-03

## 2021-11-03 RX ORDER — AZITHROMYCIN 500 MG/1
500 TABLET, FILM COATED ORAL ONCE
Refills: 0 | Status: DISCONTINUED | OUTPATIENT
Start: 2021-11-03 | End: 2021-11-03

## 2021-11-03 RX ORDER — ACETAMINOPHEN 500 MG
975 TABLET ORAL
Refills: 0 | Status: DISCONTINUED | OUTPATIENT
Start: 2021-11-03 | End: 2021-11-03

## 2021-11-03 RX ORDER — OXYCODONE HYDROCHLORIDE 5 MG/1
5 TABLET ORAL
Refills: 0 | Status: DISCONTINUED | OUTPATIENT
Start: 2021-11-03 | End: 2021-11-05

## 2021-11-03 RX ORDER — IBUPROFEN 200 MG
600 TABLET ORAL EVERY 6 HOURS
Refills: 0 | Status: COMPLETED | OUTPATIENT
Start: 2021-11-03 | End: 2022-10-02

## 2021-11-03 RX ORDER — OXYTOCIN 10 UNIT/ML
333.33 VIAL (ML) INJECTION
Qty: 20 | Refills: 0 | Status: DISCONTINUED | OUTPATIENT
Start: 2021-11-03 | End: 2021-11-05

## 2021-11-03 RX ORDER — LANOLIN
1 OINTMENT (GRAM) TOPICAL EVERY 6 HOURS
Refills: 0 | Status: DISCONTINUED | OUTPATIENT
Start: 2021-11-03 | End: 2021-11-05

## 2021-11-03 RX ORDER — SIMETHICONE 80 MG/1
80 TABLET, CHEWABLE ORAL EVERY 4 HOURS
Refills: 0 | Status: DISCONTINUED | OUTPATIENT
Start: 2021-11-03 | End: 2021-11-05

## 2021-11-03 RX ORDER — OXYCODONE HYDROCHLORIDE 5 MG/1
10 TABLET ORAL
Refills: 0 | Status: DISCONTINUED | OUTPATIENT
Start: 2021-11-03 | End: 2021-11-03

## 2021-11-03 RX ADMIN — Medication 975 MILLIGRAM(S): at 22:07

## 2021-11-03 RX ADMIN — AZITHROMYCIN 255 MILLIGRAM(S): 500 TABLET, FILM COATED ORAL at 03:15

## 2021-11-03 RX ADMIN — Medication 975 MILLIGRAM(S): at 16:26

## 2021-11-03 RX ADMIN — Medication 75 MICROGRAM(S): at 06:38

## 2021-11-03 RX ADMIN — Medication 30 MILLIGRAM(S): at 13:34

## 2021-11-03 RX ADMIN — Medication 975 MILLIGRAM(S): at 22:24

## 2021-11-03 RX ADMIN — Medication 1 TABLET(S): at 11:23

## 2021-11-03 RX ADMIN — Medication 975 MILLIGRAM(S): at 08:45

## 2021-11-03 RX ADMIN — Medication 975 MILLIGRAM(S): at 16:56

## 2021-11-03 RX ADMIN — SODIUM CHLORIDE 125 MILLILITER(S): 9 INJECTION, SOLUTION INTRAVENOUS at 11:00

## 2021-11-03 RX ADMIN — Medication 100 MILLIGRAM(S): at 02:53

## 2021-11-03 RX ADMIN — Medication 30 MILLIGRAM(S): at 17:31

## 2021-11-03 RX ADMIN — Medication 30 MILLIGRAM(S): at 17:11

## 2021-11-03 RX ADMIN — SIMETHICONE 80 MILLIGRAM(S): 80 TABLET, CHEWABLE ORAL at 22:27

## 2021-11-03 RX ADMIN — ENOXAPARIN SODIUM 40 MILLIGRAM(S): 100 INJECTION SUBCUTANEOUS at 22:08

## 2021-11-03 RX ADMIN — Medication 975 MILLIGRAM(S): at 09:30

## 2021-11-03 RX ADMIN — Medication 30 MILLIGRAM(S): at 13:04

## 2021-11-03 NOTE — BRIEF OPERATIVE NOTE - NSICDXBRIEFPOSTOP_GEN_ALL_CORE_FT
POST-OP DIAGNOSIS:  Gestational diabetes 03-Nov-2021 04:27:48 uncontrolled on metformin Elsa Jean  Hypothyroidism 03-Nov-2021 04:27:36  Elsa Jean  Pregnancy with 37 weeks completed gestation 03-Nov-2021 04:28:43  Elsa Jean  Arrest of descent, delivered, current hospitalization 03-Nov-2021 04:29:10  Elsa Jean

## 2021-11-03 NOTE — BRIEF OPERATIVE NOTE - NSICDXBRIEFPREOP_GEN_ALL_CORE_FT
PRE-OP DIAGNOSIS:  37 weeks gestation of pregnancy 03-Nov-2021 04:25:51  Elsa Jean  Arrested active phase of labor 03-Nov-2021 04:27:26 arrest of descent Elsa Jean  Hypothyroidism 03-Nov-2021 04:27:05  Elsa Jean  Gestational diabetes 03-Nov-2021 04:26:56 uncontrolled on metformin Elsa Jean

## 2021-11-03 NOTE — BRIEF OPERATIVE NOTE - ANTIBIOTIC PROTOCOL
EXAM DESCRIPTION:  RAD - Chest Single View - 9/9/2021 3:17 pm

 

CLINICAL HISTORY:  CHEST PAIN

 

COMPARISON:  Chest Single View dated 6/5/2021; Chest Single View dated 1/16/2021; Chest Single View d
ated 11/12/2020; Chest Pa And Lat (2 Views) dated 11/2/2020

 

FINDINGS:  Lines: None.

Lungs: No evidence of edema or pneumonia.

Pleural: No significant pleural effusions or pneumothorax.

Cardiac: Cardiomegaly.

Bones: No acute fractures.

Other:

 

IMPRESSION:  No acute cardiopulmonary disease. Cardiomegaly. ancef, azithromycin/Followed protocol

## 2021-11-03 NOTE — PROGRESS NOTE ADULT - ASSESSMENT
A/P: 35yo now P7 POD#0 S/P repeat C/S (#2) with an EBL of 800cc after failed induction of labor for gestational diabetes with arrest of descent, recovering well   - encourage ambulation  - encourage PO hydration  - encourage incentive spirometry use  - monitor vitals, bleeding   - simethicone & senna   - DVT ppx: lovenox & SCDs  - diet: regular   - indwelling urinary catheter until 1600  - continue IVF hydration until successful trial of void   - pain mgmt prn   - f/u 1600 CBC   - change surgical dressing at 1600    Dr. Felix aware

## 2021-11-03 NOTE — OB RN INTRAOPERATIVE NOTE - NS_ANESTHESIATYPE_OBGYN_ALL_OB
[facility] Medicine  Discharge Summary      Patient Name: Juliane Ramos  MRN: [id number]  Admission Date: 6/29/2017  Hospital Length of Stay: 5 days  Discharge Date and Time:  07/04/2017 2:03 PM  Attending Physician: Reg Devine MD   Discharging Provider: Loli Padron MD  Primary Care Provider: JOS Dumont      HPI:   No notes on file    * No surgery found *      Indwelling Lines/Drains at time of discharge:   Lines/Drains/Airways          No matching active lines, drains, or airways        Hospital Course:   Admitted with mc-lasix and aldactone and aceI held. Pt with known systolic and diastolic hf(no echo available here) and became volume overloaded so lasix resumed. MC resolved and renal function returned to normal -lasix decreased to once daily, lisinopril resumed at lower dose(2.5 from 10 mg) and aldactone not resumed. [facility] to follow and monitor labs 2x weekly and report to Dr KANCHAN Palomo her cardiologist. Home PT/OT for her debility and consider cardiopulm rehab when more stablilized.   . She will also fu with him outpt and fu with her pcp. In next 1-2 weeks.   She will continue home oxygen and portable o2 ordered. She was hypo  She has known Cad s/p CaBG and resumed asa statin and b blocker.   Elevated troponin /ck likely demand ischemia.      Per cardiology consult Dr Palomo    The patient also reported that she was staggering when walking to the bathroom this morning after waking from the nap. The patient took her oxygen off when napping this morning because her granddaughter was scared of the tubing. At baseline, her oxygen saturations drop into the 70s when she is not wearing it. The patient was recently admitted to the hospital on 6/23/2017 for an acute CHF exacerbation. She describes 40 pound weight gain from March to May, but states she lost 20 pounds during her admission. Patient was taking lasix 60 mg PO q day. Patient was recently hospiatlized from  06-23-17 to 06-27-17 related to acute systolic CHF exacerbation. Patient has an known EF of 35%..Consults:   Consults         Status Ordering Provider     Inpatient consult to Cardiac Teaching  Once     Provider:  (Not yet assigned)    Completed JAI SALEEM     Inpatient consult to Cardiology  Once     Provider:  Dale Palomo MD    Acknowledged GEORGE FERREIRA     Inpatient consult to Social Work/Case Management  Once     Provider:  (Not yet assigned)    Completed RAUL JACOBS     IP consult to dietary  Once     Provider:  (Not yet assigned)    Completed JAI SALEEM          Significant Diagnostic Studies:     Results for VERNON MILLER (MRN [id number]) as of 7/4/2017 14:03   Ref. Range 7/2/2017 04:37 7/3/2017 04:59 7/4/2017 04:20   WBC Latest Ref Range: 3.90 - 12.70 K/uL 6.10 6.90 5.40   RBC Latest Ref Range: 4.00 - 5.40 M/uL 4.53 4.42 4.51   Hemoglobin Latest Ref Range: 12.0 - 16.0 g/dL 11.3 (L) 11.0 (L) 11.2 (L)   Hematocrit Latest Ref Range: 37.0 - 48.5 % 35.9 (L) 34.6 (L) 35.3 (L)   MCV Latest Ref Range: 82 - 98 fL 79 (L) 78 (L) 78 (L)   MCH Latest Ref Range: 27.0 - 31.0 pg 24.9 (L) 24.9 (L) 24.9 (L)   MCHC Latest Ref Range: 32.0 - 36.0 % 31.4 (L) 31.7 (L) 31.9 (L)   RDW Latest Ref Range: 11.5 - 14.5 % 17.7 (H) 18.1 (H) 18.0 (H)   Platelets Latest Ref Range: 150 - 350 K/uL 167 173 186   Results for VERNON MILLER (MRN [id number]) as of 7/4/2017 14:03   Ref. Range 7/2/2017 04:37 7/3/2017 04:59 7/4/2017 04:20   Sodium Latest Ref Range: 136 - 145 mmol/L 139 135 (L) 137   Potassium Latest Ref Range: 3.5 - 5.1 mmol/L 4.4 4.6 4.3   Chloride Latest Ref Range: 95 - 110 mmol/L 100 99 98   CO2 Latest Ref Range: 23 - 29 mmol/L 31 (H) 28 30 (H)   Anion Gap Latest Ref Range: 8 - 16 mmol/L 8 8 9   BUN, Bld Latest Ref Range: 6 - 20 mg/dL 35 (H) 38 (H) 36 (H)   Creatinine Latest Ref Range: 0.5 - 1.4 mg/dL 1.3 1.1 1.1   eGFR if non African American Latest Ref Range: >60 mL/min/1.73 m^2 46 (A) 57 (A) 57 (A)   eGFR if   Latest Ref Range: >60 mL/min/1.73 m^2 53 (A) >60 >60   Glucose Latest Ref Range: 70 - 110 mg/dL 134 (H) 139 (H) 208 (H)   Results for VERNON MILLER (MRN [id number]) as of 7/4/2017 14:03   Ref. Range 7/2/2017 04:37   BNP Latest Ref Range: 0 - 99 pg/mL 1,336 (H)   Results for VERNON MILLER (MRN [id number]) as of 7/4/2017 14:03   Ref. Range 6/29/2017 23:23 6/30/2017 05:53 6/30/2017 11:23   CPK Latest Ref Range: 20 - 180 U/L 297 (H) 251 (H) 226 (H)   CPK MB Latest Ref Range: 0.1 - 6.5 ng/mL 3.5 2.3 2.3   MB% Latest Ref Range: 0.0 - 5.0 % 1.2 0.9 1.0   Troponin I Latest Ref Range: 0.000 - 0.026 ng/mL 0.121 (H) 0.102 (H)      Lexiscan (06-25-17):  1.  Large, chronic anteroseptal infarct.  No evidence for acute myocardial ischemia..  2. Dilated, hypokinetic left ventricle with a moderately reduced ejection fraction of 35%.         Pending Diagnostic Studies:     None        Final Active Diagnoses:    Diagnosis Date Noted POA    PRINCIPAL PROBLEM:  MC (acute kidney injury) [N17.9] 06/29/2017 Yes    Hyperkalemia [E87.5] 06/29/2017 Yes    Essential hypertension [I10] 06/24/2017 Yes    CKD (chronic kidney disease) stage 3, GFR 30-59 ml/min [N18.3] 06/24/2017 Yes    Pleural effusion [J90] 06/24/2017 Yes    Hypothyroid [E03.9] 06/24/2017 Yes    CAD (coronary artery disease) [I25.10] 06/24/2017 Yes    Controlled type 2 diabetes mellitus with stage 3 chronic kidney disease [E11.22, N18.3] 06/24/2017 Yes      Problems Resolved During this Admission:    Diagnosis Date Noted Date Resolved POA      No new Assessment & Plan notes have been filed under this hospital service since the last note was generated.  Service: Hospital Medicine      Discharged Condition: fair    Disposition: Home-Health Care [facility]    Follow Up:  Follow-up Information     JOS Dumont On 7/3/2017.    Specialty:  Family Medicine  Why:  11:00 am  Contact information:  [facility] [facility]  [place] [address] [phone]           "   Dale Palomo MD In 10 days.    Specialty:  Cardiology  Contact information:  [address]  [address]  [place] [address]  [phone]             [facility] [place].    Specialties:  Hospice Services, Home Health Services  Contact information:  [address]  [place] [address]  [phone]                 Patient Instructions:     Referral to Home health   Referral Priority: Routine Referral Type: Home Health   Referral Reason: Specialty Services Required    Requested Specialty: Home Health Services    Number of Visits Requested: 1      Call MD for:  difficulty breathing or increased cough     Activity as tolerated       Medications:  Reconciled Home Medications:   Current Discharge Medication List      CONTINUE these medications which have CHANGED    Details   furosemide (LASIX) 40 MG tablet ONE TAB DAILY IN AM . TAKE EXTRA 1/2 TABLET IF WEIGHT IS >3-4 POUNDS   WEIGH YOURSELF EVERY EM  Qty: 30 tablet, Refills: 0      lisinopril (PRINIVIL,ZESTRIL) 2.5 MG tablet Take 1 tablet (2.5 mg total) by mouth once daily.  Qty: 30 tablet, Refills: 2         CONTINUE these medications which have NOT CHANGED    Details   albuterol (ACCUNEB) 1.25 mg/3 mL Nebu Take 1.25 mg by nebulization every 6 (six) hours as needed. Rescue      aspirin (ECOTRIN) 81 MG EC tablet Take 1 tablet (81 mg total) by mouth once daily.  Refills: 0      carvedilol (COREG) 25 MG tablet Take 25 mg by mouth 2 (two) times daily.       citalopram (CELEXA) 20 MG tablet Take 20 mg by mouth once daily.        gabapentin (NEURONTIN) 300 MG capsule Take 300 mg by mouth 3 (three) times daily. 300 mg AM and 600 mg PM      glimepiride (AMARYL) 4 MG tablet Take 4 mg by mouth before breakfast.      insulin aspart protamine-insulin aspart (NOVOLOG 70/30) 100 unit/mL (70-30) InPn pen Inject into the skin daily as needed (pt states "She told me to just take it whenever my sugar was too high").      levothyroxine (SYNTHROID) 50 MCG tablet Take 50 " mcg by mouth once daily.        rosuvastatin (CRESTOR) 10 MG tablet Take 1 tablet (10 mg total) by mouth once daily.  Qty: 30 tablet, Refills: 0         STOP taking these medications       spironolactone (ALDACTONE) 25 MG tablet Comments:   Reason for Stopping:             Time spent on the discharge of patient: 35 minutes    Loli Padron MD  Department of Hospital Medicine  [facility]   Spinal

## 2021-11-03 NOTE — BRIEF OPERATIVE NOTE - OPERATION/FINDINGS
delivery of viable male infant in the cephalic position with APGARs 3/9. Normal appearing uterus with normal appearing bilateral fallopian tubes and ovaries

## 2021-11-03 NOTE — OB PROVIDER DELIVERY SUMMARY - NSPROVIDERDELIVERYNOTE_OBGYN_ALL_OB_FT
Delivery of viable male infant in the cephalic position with APGARs 3/9. Please see operative dictation for the full delivery summary

## 2021-11-03 NOTE — PROGRESS NOTE ADULT - ASSESSMENT
A/P: 37yo now P7 POD#0 S/P repeat C/S (#2) with an EBL of 800cc after failed induction of labor for gestational diabetes with arrest of descent, recovering well     -Monitor vitals  -Pain management PRN  -Encourage ambulation  -Incentive spirometry  -PO hydration  -DVT ppx: Lovenox, SCDs  -F/u fingersticks  -diet: regular   -indwelling urinary catheter until 1600  -continue IVF hydration until successful trial of void   -pain mgmt prn   -f/u 1600 CBC   -change surgical dressing at 1600    Dr Beckett and Dr Felix to be made aware.

## 2021-11-03 NOTE — OB PROVIDER DELIVERY SUMMARY - NSSELHIDDEN_OBGYN_ALL_OB_FT
[NS_[email or web address]_ALL_OB_FT:[email or web address]=],[NS_DeliveryAssist1_OBGYN_ALL_OB_FT:[id number]=],[NS_DeliveryRN_OBGYN_ALL_OB_FT:[email or web address]=]

## 2021-11-03 NOTE — OB RN DELIVERY SUMMARY - NSSELHIDDEN_OBGYN_ALL_OB_FT
[NS_DeliveryAttending1_OBGYN_ALL_OB_FT:[email or web address]=],[NS_DeliveryAssist1_OBGYN_ALL_OB_FT:[id number]=],[NS_DeliveryRN_OBGYN_ALL_OB_FT:[email or web address]=]

## 2021-11-04 PROCEDURE — 99231 SBSQ HOSP IP/OBS SF/LOW 25: CPT

## 2021-11-04 RX ORDER — IBUPROFEN 200 MG
600 TABLET ORAL EVERY 6 HOURS
Refills: 0 | Status: DISCONTINUED | OUTPATIENT
Start: 2021-11-04 | End: 2021-11-05

## 2021-11-04 RX ADMIN — Medication 600 MILLIGRAM(S): at 17:50

## 2021-11-04 RX ADMIN — Medication 975 MILLIGRAM(S): at 21:47

## 2021-11-04 RX ADMIN — Medication 600 MILLIGRAM(S): at 23:55

## 2021-11-04 RX ADMIN — Medication 975 MILLIGRAM(S): at 10:30

## 2021-11-04 RX ADMIN — Medication 600 MILLIGRAM(S): at 13:28

## 2021-11-04 RX ADMIN — ENOXAPARIN SODIUM 40 MILLIGRAM(S): 100 INJECTION SUBCUTANEOUS at 21:18

## 2021-11-04 RX ADMIN — Medication 600 MILLIGRAM(S): at 14:00

## 2021-11-04 RX ADMIN — Medication 1 TABLET(S): at 13:28

## 2021-11-04 RX ADMIN — Medication 975 MILLIGRAM(S): at 21:17

## 2021-11-04 RX ADMIN — Medication 600 MILLIGRAM(S): at 18:20

## 2021-11-04 RX ADMIN — Medication 75 MICROGRAM(S): at 06:33

## 2021-11-04 RX ADMIN — Medication 30 MILLIGRAM(S): at 01:03

## 2021-11-04 RX ADMIN — Medication 975 MILLIGRAM(S): at 10:02

## 2021-11-04 NOTE — PROGRESS NOTE ADULT - ATTENDING COMMENTS
Pt seen and examined at bedside, Pt is a 37yo now P7 POD#2 S/P repeat C/S (#2) with an EBL of 800cc after induction of labor for gestational diabetes poorly controlled with arrest of descent, recovering well, abd soft non tender, incision clean and dry, tolerating reg diet. HGb stable.,     - pain management with PO pain meds   - ambulation/PO hydration   - simethicone  - SCDs/lovenox for DVT prophylaxis   - routine postop care   -probable d/c in am

## 2021-11-04 NOTE — PROGRESS NOTE ADULT - ASSESSMENT
A/P: 37yo now P7 POD#2 S/P repeat C/S (#2) with an EBL of 800cc after failed induction of labor for gestational diabetes with arrest of descent, recovering well   - abdominal dressing changed  - pain management with PO pain meds   - monitor vitals/bleeding   - encourage incentive spirometry   - ambulation/PO hydration  - advance diet as tolerated   - simethicone  - SCDs/lovenox for DVT prophylaxis   - routine postop care     Dr Wilder and Dr Beckett aware

## 2021-11-05 ENCOUNTER — TRANSCRIPTION ENCOUNTER (OUTPATIENT)
Age: 36
End: 2021-11-05

## 2021-11-05 VITALS
RESPIRATION RATE: 19 BRPM | SYSTOLIC BLOOD PRESSURE: 102 MMHG | TEMPERATURE: 97 F | DIASTOLIC BLOOD PRESSURE: 51 MMHG | HEART RATE: 85 BPM

## 2021-11-05 LAB — SURGICAL PATHOLOGY STUDY: SIGNIFICANT CHANGE UP

## 2021-11-05 PROCEDURE — 99231 SBSQ HOSP IP/OBS SF/LOW 25: CPT

## 2021-11-05 RX ORDER — LEVOTHYROXINE SODIUM 125 MCG
1 TABLET ORAL
Qty: 0 | Refills: 0 | DISCHARGE
Start: 2021-11-05

## 2021-11-05 RX ORDER — OXYCODONE HYDROCHLORIDE 5 MG/1
1 TABLET ORAL
Qty: 12 | Refills: 0
Start: 2021-11-05 | End: 2021-11-07

## 2021-11-05 RX ORDER — SENNA PLUS 8.6 MG/1
1 TABLET ORAL
Qty: 28 | Refills: 0
Start: 2021-11-05 | End: 2021-11-11

## 2021-11-05 RX ORDER — ACETAMINOPHEN 500 MG
3 TABLET ORAL
Qty: 60 | Refills: 0
Start: 2021-11-05 | End: 2021-11-09

## 2021-11-05 RX ORDER — SIMETHICONE 80 MG/1
1 TABLET, CHEWABLE ORAL
Qty: 30 | Refills: 0
Start: 2021-11-05 | End: 2021-11-09

## 2021-11-05 RX ORDER — IBUPROFEN 200 MG
1 TABLET ORAL
Qty: 28 | Refills: 0
Start: 2021-11-05 | End: 2021-11-11

## 2021-11-05 RX ADMIN — Medication 975 MILLIGRAM(S): at 02:16

## 2021-11-05 RX ADMIN — Medication 600 MILLIGRAM(S): at 01:18

## 2021-11-05 RX ADMIN — Medication 975 MILLIGRAM(S): at 09:04

## 2021-11-05 RX ADMIN — Medication 1 TABLET(S): at 11:18

## 2021-11-05 RX ADMIN — Medication 75 MICROGRAM(S): at 06:22

## 2021-11-05 RX ADMIN — Medication 600 MILLIGRAM(S): at 11:18

## 2021-11-05 RX ADMIN — Medication 600 MILLIGRAM(S): at 06:21

## 2021-11-05 RX ADMIN — Medication 975 MILLIGRAM(S): at 09:35

## 2021-11-05 RX ADMIN — Medication 600 MILLIGRAM(S): at 12:00

## 2021-11-05 RX ADMIN — Medication 600 MILLIGRAM(S): at 06:36

## 2021-11-05 RX ADMIN — Medication 975 MILLIGRAM(S): at 02:45

## 2021-11-05 NOTE — DISCHARGE NOTE OB - MEDICATION SUMMARY - MEDICATIONS TO TAKE
I will START or STAY ON the medications listed below when I get home from the hospital:    acetaminophen 325 mg oral tablet  -- 3 tab(s) by mouth every 6 hours   -- Indication: For pain    ibuprofen 600 mg oral tablet  -- 1 tab(s) by mouth every 6 hours  -- Indication: For pain    oxyCODONE 5 mg oral tablet  -- 1 tab(s) by mouth every 6 hours, As Needed -Moderate to Severe Pain (4-10) MDD:MDD: 4  -- Indication: For pain    Prenatal Multivitamins with Folic Acid 1 mg oral tablet  -- 1 tab(s) by mouth once a day  -- Indication: For postpartum    senna 8.6 mg oral tablet  -- 1 tab(s) by mouth 4 times a day   -- Indication: For Constipation    simethicone 80 mg oral tablet, chewable  -- 1 tab(s) by mouth every 4 hours, As needed, Gas  -- Indication: For gas    levothyroxine 75 mcg (0.075 mg) oral tablet  -- 1 tab(s) by mouth once a day  -- Indication: For thyroid disease

## 2021-11-05 NOTE — DISCHARGE NOTE OB - PATIENT PORTAL LINK FT
You can access the FollowMyHealth Patient Portal offered by [facility] by registering at the following website: [email or web address]. By joining [employer]’s FollowMyHealth portal, you will also be able to view your health information using other applications (apps) compatible with our system.

## 2021-11-05 NOTE — DISCHARGE NOTE OB - CARE PROVIDER_API CALL
Gavin Felix)  Obstetrics and Gynecology  [address] [place] [address]  Phone: [phone]  Fax: [phone]  Established Patient  Follow Up Time: 1 week

## 2021-11-05 NOTE — DISCHARGE NOTE OB - ADDITIONAL INSTRUCTIONS
If you expirence any of the following, please notify your provider:  -fever >100.4F  -increased vaginal bleeding or clotting (saturating a pad an hour)  -foul smelling discharge or bloody discharge from your incision site  -severe abdominal, vaginal, or rectal pain   -persistent headache or vision changes  -swollen areas on your legs that are red, hot, or painful   -swollen, hot, painful areas and/or streaks on your breasts  -cracked or bleeding nipples  -mood swings, depression, or crying spells lasting more than 3 days     Nothing in the vagina for 6 weeks. No intercourse for 6 weeks. No bath tubs, swimming pools, or hot tubs for 6 weeks.     Please schedule an appointment to see your physician in 1 week for an incision check

## 2021-11-05 NOTE — PROGRESS NOTE ADULT - SUBJECTIVE AND OBJECTIVE BOX
MIRIAM GOLDBERGER  36y  Female    PGY3 Note:    POD#3    Pt is recovering well, no acute complaints. Pt denies headache, chest pain, SOB, fever, chills, nausea, vomiting, extremity pain or swelling. Pt denies headaches, vision changes, palpitations or RUQ pain. Pt denies palpitations, shortness of breath, dizziness, or syncope.     Ambulating: Yes  Voiding: Yes  Flatus: Yes  Bowel movements: No   Breast or bottle feeding: Both   Diet: Regular    MEDICATIONS  (STANDING):  acetaminophen     Tablet .. 975 milliGRAM(s) Oral <User Schedule>  enoxaparin Injectable 40 milliGRAM(s) SubCutaneous at bedtime  ibuprofen  Tablet. 600 milliGRAM(s) Oral every 6 hours  lactated ringers. 1000 milliLiter(s) (125 mL/Hr) IV Continuous <Continuous>  levothyroxine 75 MICROGram(s) Oral daily  oxytocin Infusion 333.333 milliUNIT(s)/Min (1000 mL/Hr) IV Continuous <Continuous>  prenatal multivitamin 1 Tablet(s) Oral daily    MEDICATIONS  (PRN):  dexAMETHasone  Injectable 4 milliGRAM(s) IV Push every 6 hours PRN Nausea  diphenhydrAMINE 25 milliGRAM(s) Oral every 6 hours PRN Pruritus  lanolin Ointment 1 Application(s) Topical every 6 hours PRN Sore Nipples  magnesium hydroxide Suspension 30 milliLiter(s) Oral two times a day PRN Constipation  naloxone Injectable 0.1 milliGRAM(s) IV Push every 3 minutes PRN For ANY of the following changes in patient status:  A. RR LESS THAN 10 breaths per minute, B. Oxygen saturation LESS THAN 90%, C. Sedation score of 6  ondansetron Injectable 4 milliGRAM(s) IV Push every 6 hours PRN Nausea  oxyCODONE    IR 5 milliGRAM(s) Oral every 3 hours PRN Moderate to Severe Pain (4-10)  oxyCODONE    IR 5 milliGRAM(s) Oral once PRN Moderate to Severe Pain (4-10)  simethicone 80 milliGRAM(s) Chew every 4 hours PRN Gas      PAST MEDICAL & SURGICAL HISTORY:  Hypothyroidism  Delivery by  section of full-term infant  S/P appendectomy      Physical Exam  Vital Signs Last 24 Hrs  T(C): 35.7 (2021 23:18), Max: 36.7 (2021 11:07)  T(F): 96.3 (2021 23:18), Max: 98 (2021 11:07)  HR: 97 (2021 23:18) (93 - 100)  BP: 96/54 (2021 23:18) (96/54 - 113/57)  RR: 18 (2021 23:18) (18 - 18)      Gen: AAOx3, NAD  Heart: RRR  Lungs: CTAB  Fundus: firm, below umbilicus   Wound: steris in place; incision c/d/i   Abd: Soft, appropriately tender near incision site, mildly distended, BS+  Lochia: minimal rubra  Ext: No calf tenderness, no swelling    Labs:                        9.4    13.49 )-----------( 230      ( 2021 17:30 )             30.3                         9.9    7.15  )-----------( 208      ( 2021 03:14 )             31.3        
MIRIAM GOLDBERGER  36y  Female    PGY3 Note:    POD#0  Pt is recovering well, no acute complaints. Pt denies headache, chest pain, SOB, fever, chills, nausea, vomiting, extremity pain or swelling. Pt denies headaches, vision changes, palpitations or RUQ pain. Pt denies palpitations, shortness of breath, dizziness, or syncope.     Ambulating: No, SCDs on   Voiding: No, indwelling urinary catheter in place  Flatus: No   Bowel movements: No   Breast or bottle feeding: Both    Diet: Regular    MEDICATIONS  (STANDING):  acetaminophen     Tablet .. 975 milliGRAM(s) Oral <User Schedule>  enoxaparin Injectable 40 milliGRAM(s) SubCutaneous at bedtime  ibuprofen  Tablet. 600 milliGRAM(s) Oral every 6 hours  influenza   Vaccine 0.5 milliLiter(s) IntraMuscular once  ketorolac   Injectable 30 milliGRAM(s) IV Push every 6 hours  lactated ringers. 1000 milliLiter(s) (125 mL/Hr) IV Continuous <Continuous>  levothyroxine 75 MICROGram(s) Oral daily  levothyroxine 75 MICROGram(s) Oral daily  oxytocin Infusion 333.333 milliUNIT(s)/Min (1000 mL/Hr) IV Continuous <Continuous>  prenatal multivitamin 1 Tablet(s) Oral daily    MEDICATIONS  (PRN):  dexAMETHasone  Injectable 4 milliGRAM(s) IV Push every 6 hours PRN Nausea  diphenhydrAMINE 25 milliGRAM(s) Oral every 6 hours PRN Pruritus  lanolin Ointment 1 Application(s) Topical every 6 hours PRN Sore Nipples  magnesium hydroxide Suspension 30 milliLiter(s) Oral two times a day PRN Constipation  naloxone Injectable 0.1 milliGRAM(s) IV Push every 3 minutes PRN For ANY of the following changes in patient status:  A. RR LESS THAN 10 breaths per minute, B. Oxygen saturation LESS THAN 90%, C. Sedation score of 6  ondansetron Injectable 4 milliGRAM(s) IV Push every 6 hours PRN Nausea  oxyCODONE    IR 5 milliGRAM(s) Oral every 3 hours PRN Moderate to Severe Pain (4-10)  oxyCODONE    IR 5 milliGRAM(s) Oral once PRN Moderate to Severe Pain (4-10)  simethicone 80 milliGRAM(s) Chew every 4 hours PRN Gas      PAST MEDICAL & SURGICAL HISTORY:  Hypothyroidism  Delivery by  section of full-term infant  S/P appendectomy        Physical Exam  Vital Signs Last 24 Hrs  T(C): 36.7 (2021 04:41), Max: 37.0 (2021 23:00)  T(F): 98.1 (2021 04:41), Max: 98.6 (2021 23:00)  HR: 96 (2021 06:46) (74 - 159)  BP: 95/68 (2021 06:41) (75/44 - 179/82)  RR: 18 (2021 23:00) (18 - 18)  SpO2: 97% (2021 06:41) (91% - 99%)    UO: 95cc (from [id number])    Gen: AAOx3, NAD  Heart: RRR   Lungs: CTAB   Fundus: firm, below umbilicus   Wound: surgical dressing in place; scant serosangious drainage on dressing  Abd: Soft, appropriately tender near incision site, mildly distended, BS+  Lochia: minimal rubra   Ext: No calf tenderness, no swelling    Labs:                        9.9    7.15  )-----------( 208      ( 2021 03:14 )             31.3        
PGY1 NOTE  Chief Complaint: Post  section POD 0     HPI: Pt doing well, pain well controlled. No acute complaints. She has not yet been ambulating, curtis in place, not yet passing gas, and tolerating regular diet without difficulty. She is breastfeeding without any issues. Denies N/V, fevers, chills, CP, SOB, LE edema.    PAST MEDICAL & SURGICAL HISTORY:  Hypothyroidism  Delivery by  section of full-term infant  S/P appendectomy    Physical Exam  Vital Signs Last 24 Hrs  T(F): 97.8 (2021 07:56), Max: 98.6 (2021 23:00)  HR: 97 (2021 07:56) (76 - 159)  BP: 100/52 (2021 07:56) (90/60 - 179/82)  RR: 18 (2021 07:56) (18 - 18)    Physical exam:  General - AAOx3, NAD  Heart - S1S2, RRR  Lungs - CTA BL  Abdomen:  - Soft, nontender  - Fundus firm, appropriately tender, below the umbilicus  Incision - Clean, dry, intact dressing in place over skin incision.  Pelvis/Vagina - Normal rubra lochia  Extremities - No calf tenderness, no swelling    Labs:                        9.9    7.15  )-----------( 208      ( 2021 03:14 )             31.3     Antibody Screen: NEG (21 @ 03:14)    Urine Output:  min 53cc/hr  6515-9302 180cc  [id number] 225cc    MEDICATIONS  (STANDING):  acetaminophen     Tablet .. 975 milliGRAM(s) Oral <User Schedule>  enoxaparin Injectable 40 milliGRAM(s) SubCutaneous at bedtime  ibuprofen  Tablet. 600 milliGRAM(s) Oral every 6 hours  influenza   Vaccine 0.5 milliLiter(s) IntraMuscular once  ketorolac   Injectable 30 milliGRAM(s) IV Push every 6 hours  lactated ringers. 1000 milliLiter(s) (125 mL/Hr) IV Continuous <Continuous>  levothyroxine 75 MICROGram(s) Oral daily  levothyroxine 75 MICROGram(s) Oral daily  oxytocin Infusion 333.333 milliUNIT(s)/Min (1000 mL/Hr) IV Continuous <Continuous>  prenatal multivitamin 1 Tablet(s) Oral daily    MEDICATIONS  (PRN):  dexAMETHasone  Injectable 4 milliGRAM(s) IV Push every 6 hours PRN Nausea  diphenhydrAMINE 25 milliGRAM(s) Oral every 6 hours PRN Pruritus  lanolin Ointment 1 Application(s) Topical every 6 hours PRN Sore Nipples  magnesium hydroxide Suspension 30 milliLiter(s) Oral two times a day PRN Constipation  naloxone Injectable 0.1 milliGRAM(s) IV Push every 3 minutes PRN For ANY of the following changes in patient status:  A. RR LESS THAN 10 breaths per minute, B. Oxygen saturation LESS THAN 90%, C. Sedation score of 6  ondansetron Injectable 4 milliGRAM(s) IV Push every 6 hours PRN Nausea  oxyCODONE    IR 5 milliGRAM(s) Oral every 3 hours PRN Moderate to Severe Pain (4-10)  oxyCODONE    IR 5 milliGRAM(s) Oral once PRN Moderate to Severe Pain (4-10)  simethicone 80 milliGRAM(s) Chew every 4 hours PRN Gas  
PGY1 Note    Patient seen at bedside for evaluation of "intermittent rectal pressure" and increased pain.    T(F): 98.6 (21 @ 23:00), Max: 98.6 (21 @ 06:15)  HR: 119 (21 @ 23:30) (74 - 120)  BP: 122/60 (21 @ 23:24) (75/44 - 132/76)  RR: 18 (21 @ 23:00) (18 - 18)  SpO2: 99% (21 @ 23:30) (91% - 99%)    Neuro: equal pinprick sensation in upper extremity and abdomen  EFM: 130/mod/pos acc  TOCO: 3 mins  SVE: 7/80/-1, vtx per Dr. Felix s/p AROM    Medications:  lactated ringers.: 125 mL/Hr (21 @ 01:56)  levothyroxine: 75 MICROGram(s) (21 @ 06:01)  oxytocin Infusion.: 1 mL/Hr (21 @ 04:11)      Labs:                        9.9    7.15  )-----------( 208      ( 2021 03:14 )             31.3           ABO RH Interpretation: O POS (21 @ 03:14)    Antibody Screen: NEG (21 @ 03:14)    Urinalysis Basic - ( 2021 03:15 )    Color: Yellow / Appearance: Slightly Turbid / S.032 / pH: x  Gluc: x / Ketone: Small  / Bili: Negative / Urobili: <2 mg/dL   Blood: x / Protein: 30 mg/dL / Nitrite: Negative   Leuk Esterase: Large / RBC: 4 /HPF / WBC 57 /HPF   Sq Epi: x / Non Sq Epi: 20 /HPF / Bacteria: Moderate      L&amp;D Drug Screen, Urine: Done (21 @ 03:14)    Prenatal Syphilis Test: Nonreact (21 @ 03:14)            
PGY1 Note    S: Patient seen and examined at bedside. Doing well, pain well tolerated at this time s/p epidural.     Vital Signs Last 24 Hrs  T(C): 37.0 (2021 06:15), Max: 37.0 (2021 06:15)  T(F): 98.6 (2021 06:15), Max: 98.6 (2021 06:15)  HR: 92 (2021 10:05) (74 - 105)  BP: 108/- (2021 09:45) (105/58 - 132/76)  RR: 18 (2021 06:15) (18 - 18)  SpO2: 98% (2021 09:25) (97% - 98%)    EFM: 125/mod anjel/pos accel  TOCO: q3-5  SVE: deferred, last exam @0355 2.5/50/-3, vtx, intact    Labs:                        9.9    7.15  )-----------( 208      ( 2021 03:14 )             31.3     ABO RH Interpretation: O POS (21 @ 03:14)    Urinalysis Basic - ( 2021 03:15 )  Color: Yellow / Appearance: Slightly Turbid / S.032 / pH: x  Gluc: x / Ketone: Small  / Bili: Negative / Urobili: <2 mg/dL   Blood: x / Protein: 30 mg/dL / Nitrite: Negative   Leuk Esterase: Large / RBC: 4 /HPF / WBC 57 /HPF   Sq Epi: x / Non Sq Epi: 20 /HPF / Bacteria: Moderate    Prenatal Syphilis Test: Nonreact (21 @ 03:14)    UDS: neg  Covid: neg    Meds:  Epi: @0900  Pitocin: @0432, 1x1    
PGY1 Note    S: Patient seen and examined at bedside. Doing well, pain well tolerated at this time.     Vital Signs Last 24 Hrs  T(C): 37.0 (2021 06:15), Max: 37.0 (2021 06:15)  T(F): 98.6 (2021 06:15), Max: 98.6 (2021 06:15)  HR: 77 (2021 15:35) (74 - 105)  BP: 97/54 (2021 15:30) (75/44 - 132/76)  RR: 18 (2021 06:15) (18 - 18)  SpO2: 92% (2021 15:35) (91% - 99%)    EFM: 130/mod anjel/pos accel  TOCO: q2-3  SVE: deferred, last exam @1448 3-4/80/-3, vtx, intact     Labs:                        9.9    7.15  )-----------( 208      ( 2021 03:14 )             31.3       CAPILLARY BLOOD GLUCOSE  POCT Blood Glucose.: 83 mg/dL (2021 14:23)  POCT Blood Glucose.: 105 mg/dL (2021 10:20)  POCT Blood Glucose.: 91 mg/dL (2021 06:44)  POCT Blood Glucose.: 110 mg/dL (2021 02:49)    ABO RH Interpretation: O POS (21 @ 03:14)    Urinalysis Basic - ( 2021 03:15 )  Color: Yellow / Appearance: Slightly Turbid / S.032 / pH: x  Gluc: x / Ketone: Small  / Bili: Negative / Urobili: <2 mg/dL   Blood: x / Protein: 30 mg/dL / Nitrite: Negative   Leuk Esterase: Large / RBC: 4 /HPF / WBC 57 /HPF   Sq Epi: x / Non Sq Epi: 20 /HPF / Bacteria: Moderate    Prenatal Syphilis Test: Nonreact (21 @ 03:14)    UDS: neg  Covid: neg    Meds:  Epi: @0900  Pitocin: @0432 1x1 at 10mu/min    
PGY1 Note:    Pt seen and evaluated at bedside. Pain well controlled. Denies dizziness/lightheadedness/CP/SOB/palpitations. Denies fever, chills, nausea/vomiting, diarrhea, dysuria, LE pain.     Ambulating: Yes  Voiding: Yes  Flatus: Yes  Bowel movements: Yes   Breast or bottle feeding: Both  Diet: tolerating regular diet    Physical Exam  Vital Signs Last 24 Hrs  T(C): 36.4 (04 Nov 2021 07:46), Max: 36.4 (03 Nov 2021 15:20)  T(F): 97.5 (04 Nov 2021 07:46), Max: 97.5 (03 Nov 2021 15:20)  HR: 93 (04 Nov 2021 07:46) (82 - 99)  BP: 113/57 (04 Nov 2021 07:46) (82/57 - 113/57)  RR: 18 (04 Nov 2021 07:46) (18 - 18)    Gen: AAOx3, NAD  Heart: RRR, S1S2+  Lungs: CTA B/L, no r/r/w   Fundus: firm, below umbilicus   Wound: Pfannenstiel incision, steris in place c/d/i   Abd: Soft, nontender, nondistended, BS+  Lochia: minimal   Ext: No calf tenderness, no swelling    Labs:                        9.4    13.49 )-----------( 230      ( 03 Nov 2021 17:30 )             30.3                         9.9    7.15  )-----------( 208      ( 02 Nov 2021 03:14 )             31.3        MEDICATIONS  (STANDING):  acetaminophen     Tablet .. 975 milliGRAM(s) Oral <User Schedule>  enoxaparin Injectable 40 milliGRAM(s) SubCutaneous at bedtime  ibuprofen  Tablet. 600 milliGRAM(s) Oral every 6 hours  lactated ringers. 1000 milliLiter(s) (125 mL/Hr) IV Continuous <Continuous>  levothyroxine 75 MICROGram(s) Oral daily  levothyroxine 75 MICROGram(s) Oral daily  oxytocin Infusion 333.333 milliUNIT(s)/Min (1000 mL/Hr) IV Continuous <Continuous>  prenatal multivitamin 1 Tablet(s) Oral daily    MEDICATIONS  (PRN):  dexAMETHasone  Injectable 4 milliGRAM(s) IV Push every 6 hours PRN Nausea  diphenhydrAMINE 25 milliGRAM(s) Oral every 6 hours PRN Pruritus  lanolin Ointment 1 Application(s) Topical every 6 hours PRN Sore Nipples  magnesium hydroxide Suspension 30 milliLiter(s) Oral two times a day PRN Constipation  naloxone Injectable 0.1 milliGRAM(s) IV Push every 3 minutes PRN For ANY of the following changes in patient status:  A. RR LESS THAN 10 breaths per minute, B. Oxygen saturation LESS THAN 90%, C. Sedation score of 6  ondansetron Injectable 4 milliGRAM(s) IV Push every 6 hours PRN Nausea  oxyCODONE    IR 5 milliGRAM(s) Oral every 3 hours PRN Moderate to Severe Pain (4-10)  oxyCODONE    IR 5 milliGRAM(s) Oral once PRN Moderate to Severe Pain (4-10)  simethicone 80 milliGRAM(s) Chew every 4 hours PRN Gas    
PGY1 Note    Patient seen at bedside for evaluation of labor progression, doing well, no complaints. Denies fevers, chills, SOB, CP, abdominal pain or heavy vaginal bleeding.    T(F): 98.6 (21 @ 06:15), Max: 98.6 (21 @ 06:15)  HR: 103 (21 @ 17:35) (74 - 113)  BP: 115/63 (21 @ 17:31) (75/44 - 132/76)  RR: 18 (21 @ 06:15) (18 - 18)  SpO2: 95% (21 @ 16:20) (91% - 99%)    EFM:  TOCO:  SVE: 5/-1 @ 5pm per Dr. Felix    Medications:  lactated ringers.: 125 mL/Hr (21 @ 01:56)  levothyroxine: 75 MICROGram(s) (21 @ 06:01)  oxytocin Infusion.: 1 mL/Hr (21 @ 04:11) currently on 10U      Labs:                        9.9    7.15  )-----------( 208      ( 2021 03:14 )             31.3           ABO RH Interpretation: O POS (21 @ 03:14)    Antibody Screen: NEG (21 @ 03:14)    Urinalysis Basic - ( 2021 03:15 )    Color: Yellow / Appearance: Slightly Turbid / S.032 / pH: x  Gluc: x / Ketone: Small  / Bili: Negative / Urobili: <2 mg/dL   Blood: x / Protein: 30 mg/dL / Nitrite: Negative   Leuk Esterase: Large / RBC: 4 /HPF / WBC 57 /HPF   Sq Epi: x / Non Sq Epi: 20 /HPF / Bacteria: Moderate      L&amp;D Drug Screen, Urine: Done (21 @ 03:14)    Prenatal Syphilis Test: Nonreact (21 @ 03:14)

## 2021-11-05 NOTE — DISCHARGE NOTE OB - CARE PLAN
1 Principal Discharge DX:	 delivery delivered  Assessment and plan of treatment:	healthy mother and baby

## 2021-11-05 NOTE — PROGRESS NOTE ADULT - ASSESSMENT
A/P: 37yo now P7 POD#2 S/P repeat C/S (#2) with an EBL of 800cc after failed induction of labor for gestational diabetes with arrest of descent, recovering well   - encourage ambulation  - encourage PO hydration  - encourage incentive spirometry use  - monitor vitals, bleeding   - simethicone & senna   - DVT ppx: lovenox & SCDs  - diet: regular   - pain mgmt prn   - discussed postpartum precautions   - discussed incisional precautions   - follow-up with PMD in one week for an incision check     Dr. Barrera aware

## 2021-11-05 NOTE — DISCHARGE NOTE OB - MEDICATION SUMMARY - MEDICATIONS TO STOP TAKING
Disposition: Compression stockings and short stretch elastic bandages were placed postoperatively. I will STOP taking the medications listed below when I get home from the hospital:  None

## 2021-11-10 DIAGNOSIS — E03.9 HYPOTHYROIDISM, UNSPECIFIED: ICD-10-CM

## 2021-11-10 DIAGNOSIS — O61.0 FAILED MEDICAL INDUCTION OF LABOR: ICD-10-CM

## 2021-11-10 DIAGNOSIS — O34.211 MATERNAL CARE FOR LOW TRANSVERSE SCAR FROM PREVIOUS CESAREAN DELIVERY: ICD-10-CM

## 2021-11-10 DIAGNOSIS — O32.4XX0 MATERNAL CARE FOR HIGH HEAD AT TERM, NOT APPLICABLE OR UNSPECIFIED: ICD-10-CM

## 2021-11-10 DIAGNOSIS — O36.63X0 MATERNAL CARE FOR EXCESSIVE FETAL GROWTH, THIRD TRIMESTER, NOT APPLICABLE OR UNSPECIFIED: ICD-10-CM

## 2021-11-10 DIAGNOSIS — Z3A.37 37 WEEKS GESTATION OF PREGNANCY: ICD-10-CM

## 2021-12-11 NOTE — OB RN INTRAOPERATIVE NOTE - NSANESTHESIAEND1_OBGYN_ALL_OB_DT
03-Nov-2021 04:12 Report given to Oneil PICHARDO RN. RN aware of PMH, reason for stay, and has no further questions.

## 2022-07-12 NOTE — OB RN INTRAOPERATIVE NOTE - NSSELHIDDEN_OBGYN_ALL_OB_FT
Dre here for here for irinotecan/becacizumab-bvzr (Zirabev)/5 FU CADD pump. Right upper chest port accessed using sterile technique; brisk blood return noted. Labs results previously drawn on 7/11/22 and reviewed. Lab results within parameters to proceed with treatment. UA collected. UA/labs results and blood pressure within parameters. Denies any open/unhealed wounds. Atropine given per MAR prior to Irinotecan. Irinotecan given MAR. Bevacizumab-bvzr (Zirabev) given per MAR. Dre then connected to 5FU CADD pump. Next appointment scheduled. Discharged to self care; no apparent distress noted.   [[email or web address]_OB_FT:[email or web address]],[[email or web address]_ALL_OB_FT:[id number]=],[NS_DeliveryRN_OBGYN_ALL_OB_FT:[email or web address]=]

## 2022-10-25 PROBLEM — E03.9 HYPOTHYROIDISM, UNSPECIFIED: Chronic | Status: ACTIVE | Noted: 2021-11-02

## 2022-11-28 ENCOUNTER — RX RENEWAL (OUTPATIENT)
Age: 37
End: 2022-11-28

## 2022-11-30 ENCOUNTER — APPOINTMENT (OUTPATIENT)
Dept: BARIATRICS | Facility: CLINIC | Age: 37
End: 2022-11-30

## 2022-11-30 DIAGNOSIS — E66.01 MORBID (SEVERE) OBESITY DUE TO EXCESS CALORIES: ICD-10-CM

## 2022-11-30 PROCEDURE — 99203 OFFICE O/P NEW LOW 30 MIN: CPT | Mod: 95

## 2022-11-30 NOTE — HISTORY OF PRESENT ILLNESS
[de-identified] : Ashley is a 37 year old F with profound insulin resistance who was seen in our office about a year ago at which time she had started the bariatric work up but the surgery was deferred when she got pregnant. The pregnancy was complicated with gestational diabetes and had multiple maternal fetal visit as a result was suggested for bariatric surgery as she was done nursing and taking care of the baby. Will repeat her blood work. She has been cleared by our psychologists. There are no new medical issues expect the metabolic syndrome. Denies GERD sympathology. Will have her reconvene with our nutritionists and go forward with possible VSG.

## 2022-11-30 NOTE — ASSESSMENT
[FreeTextEntry1] : Ashlye is a 37 year old F with profound insulin resistance who was seen in our office about a year ago at which time she had started the bariatric work up but the surgery was deferred when she got pregnant. The pregnancy was complicated with gestational diabetes and had multiple maternal fetal visit as a result was suggested for bariatric surgery as she was done nursing and taking care of the baby. Will repeat her blood work. She has been cleared by our psychologists. There are no new medical issues expect the metabolic syndrome. Denies GERD sympathology. Will have her reconvene with our nutritionists and go forward with possible VSG.  \par \par

## 2022-11-30 NOTE — ASSESSMENT
[FreeTextEntry1] : Ashley is a 37 year old F with profound insulin resistance who was seen in our office about a year ago at which time she had started the bariatric work up but the surgery was deferred when she got pregnant. The pregnancy was complicated with gestational diabetes and had multiple maternal fetal visit as a result was suggested for bariatric surgery as she was done nursing and taking care of the baby. Will repeat her blood work. She has been cleared by our psychologists. There are no new medical issues expect the metabolic syndrome. Denies GERD sympathology. Will have her reconvene with our nutritionists and go forward with possible VSG.  \par \par

## 2022-11-30 NOTE — HISTORY OF PRESENT ILLNESS
[de-identified] : Ashley is a 37 year old F with profound insulin resistance who was seen in our office about a year ago at which time she had started the bariatric work up but the surgery was deferred when she got pregnant. The pregnancy was complicated with gestational diabetes and had multiple maternal fetal visit as a result was suggested for bariatric surgery as she was done nursing and taking care of the baby. Will repeat her blood work. She has been cleared by our psychologists. There are no new medical issues expect the metabolic syndrome. Denies GERD sympathology. Will have her reconvene with our nutritionists and go forward with possible VSG.

## 2022-11-30 NOTE — ADDENDUM
[FreeTextEntry1] : Documented by Jaspreet Caraballo acting as a scribe for REID Neil on 11/30/2022\par

## 2022-11-30 NOTE — END OF VISIT
[FreeTextEntry3] : All medical record entries made by the Scribe were at my, REID Neil , direction and personally dictated by me on 11/30/2022 . I have reviewed the chart and agree that the record accurately reflects my personal performance of the history, physical exam, assessment and plan. I have also personally directed, reviewed, and agreed with the chart.\par

## 2022-12-08 ENCOUNTER — TRANSCRIPTION ENCOUNTER (OUTPATIENT)
Age: 37
End: 2022-12-08

## 2023-03-20 ENCOUNTER — APPOINTMENT (OUTPATIENT)
Dept: OBGYN | Facility: CLINIC | Age: 38
End: 2023-03-20
Payer: MEDICAID

## 2023-03-20 VITALS
DIASTOLIC BLOOD PRESSURE: 76 MMHG | SYSTOLIC BLOOD PRESSURE: 103 MMHG | WEIGHT: 232 LBS | BODY MASS INDEX: 41.11 KG/M2 | HEIGHT: 63 IN

## 2023-03-20 DIAGNOSIS — Z86.39 PERSONAL HISTORY OF OTHER ENDOCRINE, NUTRITIONAL AND METABOLIC DISEASE: ICD-10-CM

## 2023-03-20 DIAGNOSIS — Z01.411 ENCOUNTER FOR GYNECOLOGICAL EXAMINATION (GENERAL) (ROUTINE) WITH ABNORMAL FINDINGS: ICD-10-CM

## 2023-03-20 DIAGNOSIS — E66.01 MORBID (SEVERE) OBESITY DUE TO EXCESS CALORIES: ICD-10-CM

## 2023-03-20 DIAGNOSIS — Z30.430 ENCOUNTER FOR INSERTION OF INTRAUTERINE CONTRACEPTIVE DEVICE: ICD-10-CM

## 2023-03-20 LAB
BILIRUB UR QL STRIP: NORMAL
GLUCOSE UR-MCNC: NORMAL
HCG UR QL: 0.2 EU/DL
HCG UR QL: NEGATIVE
HGB UR QL STRIP.AUTO: NORMAL
KETONES UR-MCNC: NORMAL
LEUKOCYTE ESTERASE UR QL STRIP: NORMAL
NITRITE UR QL STRIP: NORMAL
PH UR STRIP: 5.5
PROT UR STRIP-MCNC: NORMAL
SP GR UR STRIP: >=1.03

## 2023-03-20 PROCEDURE — 76830 TRANSVAGINAL US NON-OB: CPT

## 2023-03-20 PROCEDURE — 81025 URINE PREGNANCY TEST: CPT

## 2023-03-20 PROCEDURE — 58300 INSERT INTRAUTERINE DEVICE: CPT

## 2023-03-20 PROCEDURE — 99212 OFFICE O/P EST SF 10 MIN: CPT | Mod: 25

## 2023-03-20 PROCEDURE — 99395 PREV VISIT EST AGE 18-39: CPT | Mod: 25

## 2023-03-20 PROCEDURE — 81003 URINALYSIS AUTO W/O SCOPE: CPT | Mod: QW

## 2023-03-20 RX ORDER — LEVOTHYROXINE SODIUM 75 UG/1
75 TABLET ORAL
Refills: 0 | Status: ACTIVE | COMMUNITY

## 2023-03-20 NOTE — PHYSICAL EXAM
[Chaperone Present] : A chaperone was present in the examining room during all aspects of the physical examination [Appropriately responsive] : appropriately responsive [Alert] : alert [No Acute Distress] : no acute distress [Soft] : soft [Non-tender] : non-tender [Non-distended] : non-distended [No HSM] : No HSM [No Lesions] : no lesions [No Mass] : no mass [Oriented x3] : oriented x3 [Examination Of The Breasts] : a normal appearance [No Masses] : no breast masses were palpable [Labia Majora] : normal [Labia Minora] : normal [Normal] : normal [Uterine Adnexae] : normal [FreeTextEntry1] : Macrina

## 2023-03-20 NOTE — PLAN
[FreeTextEntry1] : 38 y/o p7007 with normal exam and obesity and IUD insertion\par gc/ct/labs sent from office\par paragard iud inserted post/rb/a\par mammo post nursing\par f/u 2 weeks for f/u \par additional time 10 min\par reviewed diet\par

## 2023-03-20 NOTE — HISTORY OF PRESENT ILLNESS
[FreeTextEntry1] : 38 y/o p7007 LMP none, nursing, here for wwe and IUD insertion.  getting sleeve done for obesity.  no bleeding, pian or discharge\par \par hypothyroid 75 mcg\par \par appy\par  x 2\par \par POB hx:  first 9-1 lbs, then  x 5, largest 12 lbs, last was

## 2023-03-20 NOTE — PROCEDURE
[Locate IUD] : locate IUD [Transvaginal Ultrasound] : transvaginal ultrasound [IUD Placement] : intrauterine device (IUD) placement [Time out performed] : Pre-procedure time out performed.  Patient's name, date of birth and procedure confirmed. [Consent Obtained] : Consent obtained [Prevention of Pregnancy] : prevention of pregnancy [Risks] : risks [Benefits] : benefits [Alternatives] : alternatives [Patient] : patient [Infection] : infection [Bleeding] : bleeding [Pain] : pain [Expulsion] : expulsion [Failure] : failure [Uterine Perforation] : uterine perforation [Neg Pregnancy Test] : negative pregnancy test [Betadine] : Betadine [Tenaculum] : Tenaculum [ParaGard IUD] : ParaGard IUD [Tolerated Well] : Patient tolerated the procedure well [No Complications] : No complications [None] : None [FreeTextEntry3] : Paragard IUD in ee, no ff, no masses

## 2023-03-21 LAB
ALBUMIN SERPL ELPH-MCNC: 4.9 G/DL
ALP BLD-CCNC: 130 U/L
ALT SERPL-CCNC: 16 U/L
ANION GAP SERPL CALC-SCNC: 12 MMOL/L
AST SERPL-CCNC: 19 U/L
BILIRUB SERPL-MCNC: 0.3 MG/DL
BUN SERPL-MCNC: 12 MG/DL
C TRACH RRNA SPEC QL NAA+PROBE: NOT DETECTED
CALCIUM SERPL-MCNC: 10.3 MG/DL
CHLORIDE SERPL-SCNC: 102 MMOL/L
CO2 SERPL-SCNC: 25 MMOL/L
CREAT SERPL-MCNC: 0.61 MG/DL
EGFR: 117 ML/MIN/1.73M2
GLUCOSE SERPL-MCNC: 119 MG/DL
HCT VFR BLD CALC: 46.1 %
HGB BLD-MCNC: 14.6 G/DL
MCHC RBC-ENTMCNC: 28.2 PG
MCHC RBC-ENTMCNC: 31.7 GM/DL
MCV RBC AUTO: 89 FL
N GONORRHOEA RRNA SPEC QL NAA+PROBE: NOT DETECTED
PLATELET # BLD AUTO: 280 K/UL
POTASSIUM SERPL-SCNC: 4.2 MMOL/L
PROT SERPL-MCNC: 7.8 G/DL
RBC # BLD: 5.18 M/UL
RBC # FLD: 14.7 %
SODIUM SERPL-SCNC: 139 MMOL/L
SOURCE AMPLIFICATION: NORMAL
T4 FREE SERPL-MCNC: 1.2 NG/DL
TSH SERPL-ACNC: 1.31 UIU/ML
WBC # FLD AUTO: 6.64 K/UL

## 2023-03-23 LAB
CHOLEST SERPL-MCNC: 232 MG/DL
ESTIMATED AVERAGE GLUCOSE: 126 MG/DL
HBA1C MFR BLD HPLC: 6 %
HDLC SERPL-MCNC: 50 MG/DL
LDLC SERPL CALC-MCNC: 146 MG/DL
NONHDLC SERPL-MCNC: 182 MG/DL
TRIGL SERPL-MCNC: 180 MG/DL

## 2023-10-12 ENCOUNTER — APPOINTMENT (OUTPATIENT)
Dept: OBGYN | Facility: CLINIC | Age: 38
End: 2023-10-12
Payer: MEDICAID

## 2023-10-12 VITALS — SYSTOLIC BLOOD PRESSURE: 118 MMHG | WEIGHT: 185 LBS | DIASTOLIC BLOOD PRESSURE: 75 MMHG | BODY MASS INDEX: 32.77 KG/M2

## 2023-10-12 DIAGNOSIS — N63.0 UNSPECIFIED LUMP IN UNSPECIFIED BREAST: ICD-10-CM

## 2023-10-12 DIAGNOSIS — Z30.40 ENCOUNTER FOR SURVEILLANCE OF CONTRACEPTIVES, UNSPECIFIED: ICD-10-CM

## 2023-10-12 PROCEDURE — 81025 URINE PREGNANCY TEST: CPT

## 2023-10-12 PROCEDURE — 76830 TRANSVAGINAL US NON-OB: CPT

## 2023-10-12 PROCEDURE — 99213 OFFICE O/P EST LOW 20 MIN: CPT

## 2023-10-12 PROCEDURE — 81003 URINALYSIS AUTO W/O SCOPE: CPT | Mod: QW

## 2023-12-05 ENCOUNTER — APPOINTMENT (OUTPATIENT)
Dept: ULTRASOUND IMAGING | Facility: CLINIC | Age: 38
End: 2023-12-05
Payer: MEDICAID

## 2023-12-05 ENCOUNTER — RESULT REVIEW (OUTPATIENT)
Age: 38
End: 2023-12-05

## 2023-12-05 ENCOUNTER — OUTPATIENT (OUTPATIENT)
Dept: OUTPATIENT SERVICES | Facility: HOSPITAL | Age: 38
LOS: 1 days | End: 2023-12-05

## 2023-12-05 ENCOUNTER — APPOINTMENT (OUTPATIENT)
Dept: MAMMOGRAPHY | Facility: CLINIC | Age: 38
End: 2023-12-05
Payer: MEDICAID

## 2023-12-05 DIAGNOSIS — Z90.49 ACQUIRED ABSENCE OF OTHER SPECIFIED PARTS OF DIGESTIVE TRACT: Chronic | ICD-10-CM

## 2023-12-05 PROCEDURE — 76641 ULTRASOUND BREAST COMPLETE: CPT | Mod: 26,50

## 2023-12-05 PROCEDURE — 77062 BREAST TOMOSYNTHESIS BI: CPT | Mod: 26

## 2023-12-05 PROCEDURE — 77066 DX MAMMO INCL CAD BI: CPT | Mod: 26

## 2025-03-17 NOTE — OB RN PATIENT PROFILE - NS_PRENATALHARD_OBGYN_ALL_OB

## 2025-03-26 ENCOUNTER — APPOINTMENT (OUTPATIENT)
Dept: OBGYN | Facility: CLINIC | Age: 40
End: 2025-03-26
Payer: MEDICAID

## 2025-03-26 VITALS
DIASTOLIC BLOOD PRESSURE: 75 MMHG | WEIGHT: 172 LBS | BODY MASS INDEX: 30.48 KG/M2 | SYSTOLIC BLOOD PRESSURE: 104 MMHG | HEIGHT: 63 IN | HEART RATE: 102 BPM

## 2025-03-26 DIAGNOSIS — Z30.432 ENCOUNTER FOR REMOVAL OF INTRAUTERINE CONTRACEPTIVE DEVICE: ICD-10-CM

## 2025-03-26 LAB
BILIRUB UR QL STRIP: NORMAL
GLUCOSE UR-MCNC: NORMAL
HCG UR QL: 0.2 EU/DL
HCG UR QL: NEGATIVE
HGB UR QL STRIP.AUTO: NORMAL
KETONES UR-MCNC: NORMAL
LEUKOCYTE ESTERASE UR QL STRIP: NORMAL
NITRITE UR QL STRIP: NORMAL
PH UR STRIP: 6
PROT UR STRIP-MCNC: NORMAL
SP GR UR STRIP: 1.02

## 2025-03-26 PROCEDURE — 58301 REMOVE INTRAUTERINE DEVICE: CPT

## 2025-03-26 PROCEDURE — 81025 URINE PREGNANCY TEST: CPT

## 2025-03-26 PROCEDURE — 81003 URINALYSIS AUTO W/O SCOPE: CPT | Mod: QW

## 2025-04-01 LAB — ACTINOMYCES CULTURE FOR IUD: NORMAL

## 2025-05-09 NOTE — OB PROVIDER TRIAGE NOTE - NS_FHOBSERVED_OBGYN_ALL_OB
FOLLOW UP VISIT     Verbal permission granted by patient to leave a detailed message with medical information at phone number listed in Epic. yes    If female, are you pregnant, trying to become pregnant, or breastfeeding? no    CHIEF COMPLAINT:   Chief Complaint   Patient presents with    Office Visit       HISTORY OF PRESENT ILLNESS: The patient is a 35 year old  female here today for follow up of acne.  Last visit was 1/8/24.      Acne is generally controlled. She notes intermittent small whiteheads around her nose, development associated with diet.   Treating with spironolactone 50 mg daily. She applies clindamycin-benzoyl peroxide 1.2-5 % gel PRN as spot treatment, uses infrequently. Washes with CeraVe benzoyl peroxide wash 4% daily.     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     REVIEW OF SYSTEMS:  Yes No  []  [x]  Other skin concerns, rash, or other changing lesions  []  [x]  Fevers, current, or recent illness  [x]  []  Easy bruising or bleeding - takes ibuprofen for back pain    breast tenderness: denies  lightheadedness: denies  dizziness: denies  increased urination: denies  menstrual irregularities: denies  form of birth control: OCP Lo Loestrin Fe 1 MG-10 MCG / 10 MCG tablet     I have reviewed the past medical history, family history, social history, medications and allergies listed in the medical record as obtained by my nursing staff and support staff and agree with their documentation      PHYSICAL EXAM:   BP:  118/76  GENERAL:  pleasant, alert, no acute distress, well-groomed  Skin: Examination of areas noted was performed.     Findings include:   -face clear of acneiform lesions  -pink 8 mm lichenified pink papule left neck          ASSESSMENT AND PLAN:    Chronic skin plaque: Left neck. Suspect eczematous dermatitis, hx of hand and hip dermatitis, though persistent. Discussed biopsy. Recommend regular use of clobetasol 0.05% ointment-Apply twice daily to areas of eczema on the left neck until flat, at least 2 full weeks. Avoid manipulation. Photo taken.    Acne vulgaris: Controlled on spironolactone 50 mg daily. Discussed trial of spironolactone 25 mg daily. She elects to continue spironolactone 50 mg daily. 10/31/24 K, Cr normal. Potential side effects include menstrual irregularities, breast fullness/tenderness, birth defects, lower blood pressure, diuresis, and hyperkalemia. The patient doesn't have a history of kidney disease and is not on any antihypertensives. She is using OCP for contraception.     Continue clindamycin-benzoyl peroxide 1.2-5 % gel PRN, benzoyl peroxide wash 4% daily. Discussed trial of OTC adapalene 0.1% gel nightly.     On 5/9/2025, IBrenda MA scribed the services personally performed by Delmy Ferrari MD     Return 6-8 week f/u left neck; 1 year f/u acne    I, Delmy Ferrari MD, attest that the documentation recorded by the scribe accurately and completely reflects the service(s) I personally performed and the decisions made by me. I also reviewed and verified the scribe's note, which I edited as appropriate.            Yes